# Patient Record
Sex: MALE | Race: WHITE | Employment: OTHER | URBAN - METROPOLITAN AREA
[De-identification: names, ages, dates, MRNs, and addresses within clinical notes are randomized per-mention and may not be internally consistent; named-entity substitution may affect disease eponyms.]

---

## 2017-06-12 PROBLEM — I26.99 ACUTE PULMONARY EMBOLISM (HCC): Status: ACTIVE | Noted: 2017-06-12

## 2017-06-13 PROBLEM — N17.9 ACUTE KIDNEY INJURY (NONTRAUMATIC) (HCC): Status: ACTIVE | Noted: 2017-06-13

## 2017-08-24 PROBLEM — I34.0 NON-RHEUMATIC MITRAL REGURGITATION: Status: ACTIVE | Noted: 2017-08-24

## 2017-08-24 PROBLEM — Z79.01 ANTICOAGULATED ON COUMADIN: Status: ACTIVE | Noted: 2017-08-24

## 2017-08-24 PROBLEM — Z86.711 HX OF PULMONARY EMBOLUS: Status: ACTIVE | Noted: 2017-08-24

## 2017-10-11 PROBLEM — N30.00 ACUTE CYSTITIS: Status: ACTIVE | Noted: 2017-10-11

## 2017-10-11 PROBLEM — T67.1XXA HEAT SYNCOPE: Status: ACTIVE | Noted: 2017-10-11

## 2018-01-16 PROBLEM — R59.0 HILAR ADENOPATHY: Status: ACTIVE | Noted: 2018-01-16

## 2018-01-16 PROBLEM — R59.0 MEDIASTINAL ADENOPATHY: Status: ACTIVE | Noted: 2018-01-16

## 2018-01-16 PROBLEM — J42 CHRONIC BRONCHITIS (HCC): Status: ACTIVE | Noted: 2018-01-16

## 2018-04-10 ENCOUNTER — HOSPITAL ENCOUNTER (OUTPATIENT)
Dept: CT IMAGING | Age: 78
Discharge: HOME | End: 2018-04-10
Payer: MEDICARE

## 2018-04-10 DIAGNOSIS — R59.1 LYMPHADENOPATHY: ICD-10-CM

## 2018-04-10 LAB
BUN BLDV-MCNC: 32 MG/DL (ref 8–23)
CREAT SERPL-MCNC: 1.1 MG/DL (ref 0.7–1.2)
GFR AFRICAN AMERICAN: >60
GFR NON-AFRICAN AMERICAN: >60 ML/MIN/1.73

## 2018-04-10 PROCEDURE — 82565 ASSAY OF CREATININE: CPT

## 2018-04-10 PROCEDURE — 71260 CT THORAX DX C+: CPT

## 2018-04-10 PROCEDURE — 84520 ASSAY OF UREA NITROGEN: CPT

## 2018-04-10 PROCEDURE — 6360000004 HC RX CONTRAST MEDICATION: Performed by: RADIOLOGY

## 2018-04-10 PROCEDURE — 36415 COLL VENOUS BLD VENIPUNCTURE: CPT

## 2018-04-10 RX ADMIN — IOPAMIDOL 80 ML: 755 INJECTION, SOLUTION INTRAVENOUS at 09:29

## 2018-04-19 ENCOUNTER — OFFICE VISIT (OUTPATIENT)
Dept: CARDIOLOGY CLINIC | Age: 78
End: 2018-04-19
Payer: MEDICARE

## 2018-04-19 VITALS
WEIGHT: 192 LBS | DIASTOLIC BLOOD PRESSURE: 54 MMHG | SYSTOLIC BLOOD PRESSURE: 120 MMHG | BODY MASS INDEX: 29.1 KG/M2 | HEIGHT: 68 IN | HEART RATE: 74 BPM

## 2018-04-19 DIAGNOSIS — I36.1 NON-RHEUMATIC TRICUSPID VALVE INSUFFICIENCY: ICD-10-CM

## 2018-04-19 DIAGNOSIS — E11.42 DIABETIC PERIPHERAL NEUROPATHY (HCC): ICD-10-CM

## 2018-04-19 DIAGNOSIS — I44.0 FIRST DEGREE AV BLOCK: ICD-10-CM

## 2018-04-19 DIAGNOSIS — I44.7 LBBB (LEFT BUNDLE BRANCH BLOCK): ICD-10-CM

## 2018-04-19 DIAGNOSIS — Z90.79 S/P TURP: ICD-10-CM

## 2018-04-19 DIAGNOSIS — D64.9 ANEMIA, UNSPECIFIED TYPE: ICD-10-CM

## 2018-04-19 DIAGNOSIS — I25.2 HX OF NON-ST ELEVATION MYOCARDIAL INFARCTION (NSTEMI): ICD-10-CM

## 2018-04-19 DIAGNOSIS — I25.810 CORONARY ARTERY DISEASE INVOLVING CORONARY BYPASS GRAFT OF NATIVE HEART WITHOUT ANGINA PECTORIS: ICD-10-CM

## 2018-04-19 DIAGNOSIS — L13.0 DERMATITIS HERPETIFORMIS: ICD-10-CM

## 2018-04-19 DIAGNOSIS — Z86.711 HX OF PULMONARY EMBOLUS: ICD-10-CM

## 2018-04-19 DIAGNOSIS — I25.5 ISCHEMIC CARDIOMYOPATHY: ICD-10-CM

## 2018-04-19 DIAGNOSIS — E78.2 MIXED HYPERLIPIDEMIA: ICD-10-CM

## 2018-04-19 DIAGNOSIS — I25.10 CAD IN NATIVE ARTERY: ICD-10-CM

## 2018-04-19 DIAGNOSIS — I50.42 CHRONIC COMBINED SYSTOLIC AND DIASTOLIC CONGESTIVE HEART FAILURE (HCC): ICD-10-CM

## 2018-04-19 DIAGNOSIS — T46.4X5A COUGH DUE TO ACE INHIBITOR: ICD-10-CM

## 2018-04-19 DIAGNOSIS — I25.2 H/O ACUTE MYOCARDIAL INFARCTION OF INFERIOR WALL: ICD-10-CM

## 2018-04-19 DIAGNOSIS — Z79.4 TYPE 2 DIABETES MELLITUS WITH COMPLICATION, WITH LONG-TERM CURRENT USE OF INSULIN (HCC): ICD-10-CM

## 2018-04-19 DIAGNOSIS — Z95.1 HX OF CABG: ICD-10-CM

## 2018-04-19 DIAGNOSIS — R05.8 COUGH DUE TO ACE INHIBITOR: ICD-10-CM

## 2018-04-19 DIAGNOSIS — E11.8 TYPE 2 DIABETES MELLITUS WITH COMPLICATION, WITH LONG-TERM CURRENT USE OF INSULIN (HCC): ICD-10-CM

## 2018-04-19 DIAGNOSIS — I10 ESSENTIAL HYPERTENSION: Primary | ICD-10-CM

## 2018-04-19 DIAGNOSIS — Z87.01 H/O: PNEUMONIA: ICD-10-CM

## 2018-04-19 DIAGNOSIS — N18.30 STAGE 3 CHRONIC KIDNEY DISEASE (HCC): ICD-10-CM

## 2018-04-19 DIAGNOSIS — I73.9 PAD (PERIPHERAL ARTERY DISEASE) (HCC): ICD-10-CM

## 2018-04-19 DIAGNOSIS — J44.9 CHRONIC OBSTRUCTIVE PULMONARY DISEASE, UNSPECIFIED COPD TYPE (HCC): ICD-10-CM

## 2018-04-19 DIAGNOSIS — I34.0 NON-RHEUMATIC MITRAL REGURGITATION: ICD-10-CM

## 2018-04-19 DIAGNOSIS — Z92.89 TRANSFUSION HISTORY: ICD-10-CM

## 2018-04-19 PROCEDURE — 99214 OFFICE O/P EST MOD 30 MIN: CPT | Performed by: INTERNAL MEDICINE

## 2018-04-19 PROCEDURE — 93000 ELECTROCARDIOGRAM COMPLETE: CPT | Performed by: INTERNAL MEDICINE

## 2018-04-19 RX ORDER — PREDNISONE 10 MG/1
TABLET ORAL
Refills: 1 | COMMUNITY
Start: 2018-03-28 | End: 2018-10-18

## 2018-05-02 ENCOUNTER — OFFICE VISIT (OUTPATIENT)
Dept: ONCOLOGY | Age: 78
End: 2018-05-02
Payer: MEDICARE

## 2018-05-02 ENCOUNTER — HOSPITAL ENCOUNTER (OUTPATIENT)
Dept: INFUSION THERAPY | Age: 78
Discharge: HOME | End: 2018-05-02
Payer: MEDICARE

## 2018-05-02 VITALS
SYSTOLIC BLOOD PRESSURE: 118 MMHG | TEMPERATURE: 97.3 F | WEIGHT: 191.5 LBS | DIASTOLIC BLOOD PRESSURE: 63 MMHG | BODY MASS INDEX: 29.02 KG/M2 | RESPIRATION RATE: 20 BRPM | HEIGHT: 68 IN | HEART RATE: 59 BPM

## 2018-05-02 DIAGNOSIS — D64.9 ANEMIA, UNSPECIFIED TYPE: Primary | ICD-10-CM

## 2018-05-02 DIAGNOSIS — R59.0 MEDIASTINAL ADENOPATHY: ICD-10-CM

## 2018-05-02 LAB
ALBUMIN SERPL-MCNC: 4 G/DL (ref 3.5–5.2)
ALP BLD-CCNC: 63 U/L (ref 40–129)
ALT SERPL-CCNC: 41 U/L (ref 0–40)
ANION GAP SERPL CALCULATED.3IONS-SCNC: 10 MMOL/L (ref 7–16)
ANISOCYTOSIS: ABNORMAL
AST SERPL-CCNC: 27 U/L (ref 0–39)
BASOPHILS ABSOLUTE: 0.02 E9/L (ref 0–0.2)
BASOPHILS RELATIVE PERCENT: 0.3 % (ref 0–2)
BILIRUB SERPL-MCNC: 0.4 MG/DL (ref 0–1.2)
BILIRUBIN URINE: NEGATIVE
BLOOD, URINE: NEGATIVE
BUN BLDV-MCNC: 35 MG/DL (ref 8–23)
CALCIUM SERPL-MCNC: 9.3 MG/DL (ref 8.6–10.2)
CHLORIDE BLD-SCNC: 98 MMOL/L (ref 98–107)
CLARITY: CLEAR
CO2: 27 MMOL/L (ref 22–29)
COLOR: YELLOW
CREAT SERPL-MCNC: 1 MG/DL (ref 0.7–1.2)
EOSINOPHILS ABSOLUTE: 0.01 E9/L (ref 0.05–0.5)
EOSINOPHILS RELATIVE PERCENT: 0.1 % (ref 0–6)
FERRITIN: 43 NG/ML
FOLATE: >20 NG/ML (ref 4.8–24.2)
GFR AFRICAN AMERICAN: >60
GFR NON-AFRICAN AMERICAN: >60 ML/MIN/1.73
GLUCOSE BLD-MCNC: 282 MG/DL (ref 74–109)
GLUCOSE URINE: 500 MG/DL
HCT VFR BLD CALC: 37.9 % (ref 37–54)
HEMOGLOBIN: 12.2 G/DL (ref 12.5–16.5)
HYPOCHROMIA: ABNORMAL
IMMATURE GRANULOCYTES #: 0.06 E9/L
IMMATURE GRANULOCYTES %: 0.8 % (ref 0–5)
IMMATURE RETIC FRACT: 8.2 % (ref 2.3–13.4)
IRON SATURATION: 67 % (ref 20–55)
IRON: 228 MCG/DL (ref 59–158)
KETONES, URINE: ABNORMAL MG/DL
LACTATE DEHYDROGENASE: 338 U/L (ref 135–225)
LEUKOCYTE ESTERASE, URINE: NEGATIVE
LYMPHOCYTES ABSOLUTE: 0.65 E9/L (ref 1.5–4)
LYMPHOCYTES RELATIVE PERCENT: 9 % (ref 20–42)
MCH RBC QN AUTO: 28 PG (ref 26–35)
MCHC RBC AUTO-ENTMCNC: 32.2 % (ref 32–34.5)
MCV RBC AUTO: 87.1 FL (ref 80–99.9)
MONOCYTES ABSOLUTE: 0.29 E9/L (ref 0.1–0.95)
MONOCYTES RELATIVE PERCENT: 4 % (ref 2–12)
NEUTROPHILS ABSOLUTE: 6.17 E9/L (ref 1.8–7.3)
NEUTROPHILS RELATIVE PERCENT: 85.8 % (ref 43–80)
NITRITE, URINE: NEGATIVE
OVALOCYTES: ABNORMAL
PDW BLD-RTO: 20.8 FL (ref 11.5–15)
PH UA: 8 (ref 5–9)
PLATELET # BLD: 215 E9/L (ref 130–450)
PMV BLD AUTO: 10.9 FL (ref 7–12)
POIKILOCYTES: ABNORMAL
POTASSIUM SERPL-SCNC: 5.8 MMOL/L (ref 3.5–5)
PROTEIN UA: NEGATIVE MG/DL
RBC # BLD: 4.35 E12/L (ref 3.8–5.8)
RETIC HGB EQUIVALENT: 36.9 PG (ref 28.2–36.6)
RETICULOCYTE ABSOLUTE COUNT: 0.05 E12/L
RETICULOCYTE COUNT PCT: 1.2 % (ref 0.4–1.9)
SODIUM BLD-SCNC: 135 MMOL/L (ref 132–146)
SPECIFIC GRAVITY UA: 1.01 (ref 1–1.03)
TARGET CELLS: ABNORMAL
TOTAL IRON BINDING CAPACITY: 341 MCG/DL (ref 250–450)
TOTAL PROTEIN: 6.5 G/DL (ref 6.4–8.3)
TSH SERPL DL<=0.05 MIU/L-ACNC: 1.03 UIU/ML (ref 0.27–4.2)
UROBILINOGEN, URINE: 0.2 E.U./DL
VITAMIN B-12: 760 PG/ML (ref 211–946)
WBC # BLD: 7.2 E9/L (ref 4.5–11.5)

## 2018-05-02 PROCEDURE — 36415 COLL VENOUS BLD VENIPUNCTURE: CPT

## 2018-05-02 PROCEDURE — 84443 ASSAY THYROID STIM HORMONE: CPT

## 2018-05-02 PROCEDURE — 83550 IRON BINDING TEST: CPT

## 2018-05-02 PROCEDURE — 85045 AUTOMATED RETICULOCYTE COUNT: CPT

## 2018-05-02 PROCEDURE — 99214 OFFICE O/P EST MOD 30 MIN: CPT

## 2018-05-02 PROCEDURE — 82955 ASSAY OF G6PD ENZYME: CPT

## 2018-05-02 PROCEDURE — 81003 URINALYSIS AUTO W/O SCOPE: CPT

## 2018-05-02 PROCEDURE — 82784 ASSAY IGA/IGD/IGG/IGM EACH: CPT

## 2018-05-02 PROCEDURE — 83516 IMMUNOASSAY NONANTIBODY: CPT

## 2018-05-02 PROCEDURE — 82746 ASSAY OF FOLIC ACID SERUM: CPT

## 2018-05-02 PROCEDURE — 82728 ASSAY OF FERRITIN: CPT

## 2018-05-02 PROCEDURE — 83615 LACTATE (LD) (LDH) ENZYME: CPT

## 2018-05-02 PROCEDURE — 80053 COMPREHEN METABOLIC PANEL: CPT

## 2018-05-02 PROCEDURE — 82607 VITAMIN B-12: CPT

## 2018-05-02 PROCEDURE — 83540 ASSAY OF IRON: CPT

## 2018-05-02 PROCEDURE — 85025 COMPLETE CBC W/AUTO DIFF WBC: CPT

## 2018-05-04 LAB
G-6-PD, QUANT: 13.8 U/G HB (ref 9.9–16.6)
IGA: 201 MG/DL (ref 70–400)
IGG: 608 MG/DL (ref 700–1600)
IGM: 48 MG/DL (ref 40–230)
TISSUE TRANSGLUTAMINASE IGA: 4 U/ML (ref 0–3)

## 2018-05-30 ENCOUNTER — HOSPITAL ENCOUNTER (OUTPATIENT)
Dept: INFUSION THERAPY | Age: 78
Discharge: HOME | End: 2018-05-30
Payer: MEDICARE

## 2018-05-30 ENCOUNTER — OFFICE VISIT (OUTPATIENT)
Dept: ONCOLOGY | Age: 78
End: 2018-05-30
Payer: MEDICARE

## 2018-05-30 VITALS
TEMPERATURE: 97.6 F | WEIGHT: 190.9 LBS | DIASTOLIC BLOOD PRESSURE: 55 MMHG | HEART RATE: 63 BPM | HEIGHT: 68 IN | SYSTOLIC BLOOD PRESSURE: 112 MMHG | RESPIRATION RATE: 16 BRPM | BODY MASS INDEX: 28.93 KG/M2

## 2018-05-30 DIAGNOSIS — D64.9 ANEMIA, UNSPECIFIED TYPE: Primary | ICD-10-CM

## 2018-05-30 PROCEDURE — 99212 OFFICE O/P EST SF 10 MIN: CPT | Performed by: INTERNAL MEDICINE

## 2018-09-26 ENCOUNTER — HOSPITAL ENCOUNTER (OUTPATIENT)
Age: 78
Discharge: HOME | End: 2018-09-26
Payer: MEDICARE

## 2018-09-26 ENCOUNTER — OFFICE VISIT (OUTPATIENT)
Dept: ONCOLOGY | Age: 78
End: 2018-09-26
Payer: MEDICARE

## 2018-09-26 ENCOUNTER — HOSPITAL ENCOUNTER (OUTPATIENT)
Dept: INFUSION THERAPY | Age: 78
Discharge: HOME | End: 2018-09-26
Payer: MEDICARE

## 2018-09-26 VITALS
WEIGHT: 191.4 LBS | RESPIRATION RATE: 20 BRPM | DIASTOLIC BLOOD PRESSURE: 53 MMHG | TEMPERATURE: 97.3 F | SYSTOLIC BLOOD PRESSURE: 115 MMHG | HEART RATE: 55 BPM | HEIGHT: 68 IN | BODY MASS INDEX: 29.01 KG/M2

## 2018-09-26 DIAGNOSIS — D64.9 ANEMIA, UNSPECIFIED TYPE: ICD-10-CM

## 2018-09-26 DIAGNOSIS — D64.9 ANEMIA, UNSPECIFIED TYPE: Primary | ICD-10-CM

## 2018-09-26 LAB
ALBUMIN SERPL-MCNC: 3.9 G/DL (ref 3.5–5.2)
ALBUMIN SERPL-MCNC: 3.9 G/DL (ref 3.5–5.2)
ALP BLD-CCNC: 71 U/L (ref 40–129)
ALP BLD-CCNC: 72 U/L (ref 40–129)
ALT SERPL-CCNC: 26 U/L (ref 0–40)
ALT SERPL-CCNC: 27 U/L (ref 0–40)
ANION GAP SERPL CALCULATED.3IONS-SCNC: 10 MMOL/L (ref 7–16)
ANION GAP SERPL CALCULATED.3IONS-SCNC: 11 MMOL/L (ref 7–16)
AST SERPL-CCNC: 26 U/L (ref 0–39)
AST SERPL-CCNC: 28 U/L (ref 0–39)
BASOPHILS ABSOLUTE: 0.05 E9/L (ref 0–0.2)
BASOPHILS RELATIVE PERCENT: 0.9 % (ref 0–2)
BILIRUB SERPL-MCNC: 0.4 MG/DL (ref 0–1.2)
BILIRUB SERPL-MCNC: 0.4 MG/DL (ref 0–1.2)
BUN BLDV-MCNC: 29 MG/DL (ref 8–23)
BUN BLDV-MCNC: 29 MG/DL (ref 8–23)
CALCIUM SERPL-MCNC: 9.4 MG/DL (ref 8.6–10.2)
CALCIUM SERPL-MCNC: 9.5 MG/DL (ref 8.6–10.2)
CHLORIDE BLD-SCNC: 106 MMOL/L (ref 98–107)
CHLORIDE BLD-SCNC: 107 MMOL/L (ref 98–107)
CHOLESTEROL, TOTAL: 141 MG/DL (ref 0–199)
CO2: 28 MMOL/L (ref 22–29)
CO2: 28 MMOL/L (ref 22–29)
CREAT SERPL-MCNC: 1.2 MG/DL (ref 0.7–1.2)
CREAT SERPL-MCNC: 1.2 MG/DL (ref 0.7–1.2)
EOSINOPHILS ABSOLUTE: 0.31 E9/L (ref 0.05–0.5)
EOSINOPHILS RELATIVE PERCENT: 5.6 % (ref 0–6)
GFR AFRICAN AMERICAN: >60
GFR AFRICAN AMERICAN: >60
GFR NON-AFRICAN AMERICAN: 58 ML/MIN/1.73
GFR NON-AFRICAN AMERICAN: 58 ML/MIN/1.73
GLUCOSE BLD-MCNC: 154 MG/DL (ref 74–109)
GLUCOSE BLD-MCNC: 155 MG/DL (ref 74–109)
HBA1C MFR BLD: 6.1 % (ref 4–5.6)
HCT VFR BLD CALC: 35.8 % (ref 37–54)
HDLC SERPL-MCNC: 52 MG/DL
HEMOGLOBIN: 11.9 G/DL (ref 12.5–16.5)
IMMATURE GRANULOCYTES #: 0.01 E9/L
IMMATURE GRANULOCYTES %: 0.2 % (ref 0–5)
LACTATE DEHYDROGENASE: 303 U/L (ref 135–225)
LDL CHOLESTEROL CALCULATED: 66 MG/DL (ref 0–99)
LYMPHOCYTES ABSOLUTE: 1.51 E9/L (ref 1.5–4)
LYMPHOCYTES RELATIVE PERCENT: 27.1 % (ref 20–42)
MCH RBC QN AUTO: 34 PG (ref 26–35)
MCHC RBC AUTO-ENTMCNC: 33.2 % (ref 32–34.5)
MCV RBC AUTO: 102.3 FL (ref 80–99.9)
MONOCYTES ABSOLUTE: 0.62 E9/L (ref 0.1–0.95)
MONOCYTES RELATIVE PERCENT: 11.1 % (ref 2–12)
NEUTROPHILS ABSOLUTE: 3.07 E9/L (ref 1.8–7.3)
NEUTROPHILS RELATIVE PERCENT: 55.1 % (ref 43–80)
PDW BLD-RTO: 12.4 FL (ref 11.5–15)
PLATELET # BLD: 162 E9/L (ref 130–450)
PMV BLD AUTO: 11.5 FL (ref 7–12)
POTASSIUM SERPL-SCNC: 4 MMOL/L (ref 3.5–5)
POTASSIUM SERPL-SCNC: 4.1 MMOL/L (ref 3.5–5)
RBC # BLD: 3.5 E12/L (ref 3.8–5.8)
SODIUM BLD-SCNC: 144 MMOL/L (ref 132–146)
SODIUM BLD-SCNC: 146 MMOL/L (ref 132–146)
TOTAL PROTEIN: 6.3 G/DL (ref 6.4–8.3)
TOTAL PROTEIN: 6.4 G/DL (ref 6.4–8.3)
TRIGL SERPL-MCNC: 115 MG/DL (ref 0–149)
VLDLC SERPL CALC-MCNC: 23 MG/DL
WBC # BLD: 5.6 E9/L (ref 4.5–11.5)

## 2018-09-26 PROCEDURE — 83615 LACTATE (LD) (LDH) ENZYME: CPT

## 2018-09-26 PROCEDURE — 99212 OFFICE O/P EST SF 10 MIN: CPT | Performed by: INTERNAL MEDICINE

## 2018-09-26 PROCEDURE — 80053 COMPREHEN METABOLIC PANEL: CPT

## 2018-09-26 PROCEDURE — 83721 ASSAY OF BLOOD LIPOPROTEIN: CPT

## 2018-09-26 PROCEDURE — 36415 COLL VENOUS BLD VENIPUNCTURE: CPT

## 2018-09-26 PROCEDURE — 36415 COLL VENOUS BLD VENIPUNCTURE: CPT | Performed by: INTERNAL MEDICINE

## 2018-09-26 PROCEDURE — 80061 LIPID PANEL: CPT

## 2018-09-26 PROCEDURE — 85025 COMPLETE CBC W/AUTO DIFF WBC: CPT

## 2018-09-26 PROCEDURE — 83036 HEMOGLOBIN GLYCOSYLATED A1C: CPT

## 2018-09-26 NOTE — PROGRESS NOTES
[address] [facility],[address]  [facility]        Pt Name: Gurvinder Hurley  YOB: 1940  Date of evaluation: 9/26/2018  Primary Care Physician: Megha Torrez MD  Reason for evaluation:   Chief Complaint   Patient presents with    Anemia    Follow-up        Subjective: Here for follow-up. Feels well today. On Dapsone,  initiated by his dermatologist   His [address] is very well controlled    OBJECTIVE:  VITALS:  height is 5' 8\" (1.727 m) and weight is 191 lb 6.4 oz (86.8 kg). His temporal temperature is 97.3 °F (36.3 °C). His blood pressure is 115/53 (abnormal) and his pulse is 55. His respiration is 20. Physical Exam:  Performance Status: 0  Well developed, well nourished male  General: AAO to person, place, time, in no acute distress, pleasant   Head and neck : PERRLA, EOMI . Sclera non icteric. Oropharynx : Clear  Neck: no JVD,  no adenopathy,  Heart: Regular rate and regular rhythm, no murmur. Mid sternotomy scar noted  Lungs: Few scattered expiratory rhonchi  Abdomen: Soft, non-tender;no masses, no organomegaly  Extremities: No edema,no cyanosis, no clubbing. Neurologic:Cranial nerves grossly intact. No focal motor or sensory deficits . Skin:  Nodular rash on extremities consistent with DH      Medications  Prior to Admission medications    Medication Sig Start Date End Date Taking?  Authorizing Provider   Cholecalciferol (VITAMIN D3) 5000 units TABS Take by mouth   Yes Historical Provider, MD   predniSONE (DELTASONE) 10 MG tablet TK 2 TS PO QAM WITH FOOD 3/28/18  Yes Historical Provider, MD   rivaroxaban (XARELTO) 20 MG TABS tablet Take 1 tablet by mouth daily 3/19/18  Yes Sheldon Chen, MD   sacubitril-valsartan (ENTRESTO) 24-26 MG per tablet Take 1 tablet by mouth 2 times daily 3/1/18  Yes Sheldon Chen, MD   Insulin Aspart Prot & Aspart (NOVOLOG MIX 70/30 FLEXPEN SC) Inject 25 Units into the skin   Yes Historical Provider, MD   pantoprazole (PROTONIX) 40 MG tablet TK 1 T

## 2018-09-28 LAB — MISCELLANEOUS LAB TEST RESULT: NORMAL

## 2018-10-18 ENCOUNTER — OFFICE VISIT (OUTPATIENT)
Dept: CARDIOLOGY CLINIC | Age: 78
End: 2018-10-18
Payer: MEDICARE

## 2018-10-18 VITALS
HEIGHT: 68 IN | HEART RATE: 60 BPM | BODY MASS INDEX: 29.7 KG/M2 | SYSTOLIC BLOOD PRESSURE: 110 MMHG | WEIGHT: 196 LBS | DIASTOLIC BLOOD PRESSURE: 60 MMHG

## 2018-10-18 DIAGNOSIS — I25.5 ISCHEMIC CARDIOMYOPATHY: Primary | ICD-10-CM

## 2018-10-18 DIAGNOSIS — I25.2 H/O ACUTE MYOCARDIAL INFARCTION OF INFERIOR WALL: ICD-10-CM

## 2018-10-18 DIAGNOSIS — I25.10 CAD IN NATIVE ARTERY: ICD-10-CM

## 2018-10-18 DIAGNOSIS — Z92.89 TRANSFUSION HISTORY: ICD-10-CM

## 2018-10-18 DIAGNOSIS — R59.0 MEDIASTINAL ADENOPATHY: ICD-10-CM

## 2018-10-18 DIAGNOSIS — E11.8 TYPE 2 DIABETES MELLITUS WITH COMPLICATION, WITH LONG-TERM CURRENT USE OF INSULIN (HCC): ICD-10-CM

## 2018-10-18 DIAGNOSIS — R59.0 HILAR ADENOPATHY: ICD-10-CM

## 2018-10-18 DIAGNOSIS — D64.9 ANEMIA, UNSPECIFIED TYPE: ICD-10-CM

## 2018-10-18 DIAGNOSIS — I50.22 CHRONIC SYSTOLIC CONGESTIVE HEART FAILURE (HCC): ICD-10-CM

## 2018-10-18 DIAGNOSIS — Z79.4 TYPE 2 DIABETES MELLITUS WITH COMPLICATION, WITH LONG-TERM CURRENT USE OF INSULIN (HCC): ICD-10-CM

## 2018-10-18 DIAGNOSIS — I44.0 FIRST DEGREE AV BLOCK: ICD-10-CM

## 2018-10-18 DIAGNOSIS — J42 CHRONIC BRONCHITIS, UNSPECIFIED CHRONIC BRONCHITIS TYPE (HCC): ICD-10-CM

## 2018-10-18 DIAGNOSIS — I25.810 CORONARY ARTERY DISEASE INVOLVING CORONARY BYPASS GRAFT OF NATIVE HEART WITHOUT ANGINA PECTORIS: ICD-10-CM

## 2018-10-18 DIAGNOSIS — Z95.1 HX OF CABG: ICD-10-CM

## 2018-10-18 DIAGNOSIS — I25.2 HX OF NON-ST ELEVATION MYOCARDIAL INFARCTION (NSTEMI): ICD-10-CM

## 2018-10-18 DIAGNOSIS — I73.9 PAD (PERIPHERAL ARTERY DISEASE) (HCC): ICD-10-CM

## 2018-10-18 DIAGNOSIS — L13.0 DERMATITIS HERPETIFORMIS: ICD-10-CM

## 2018-10-18 DIAGNOSIS — I51.9 LV DYSFUNCTION: ICD-10-CM

## 2018-10-18 DIAGNOSIS — R05.8 COUGH DUE TO ACE INHIBITOR: ICD-10-CM

## 2018-10-18 DIAGNOSIS — Z86.711 HX OF PULMONARY EMBOLUS: ICD-10-CM

## 2018-10-18 DIAGNOSIS — I10 ESSENTIAL HYPERTENSION: ICD-10-CM

## 2018-10-18 DIAGNOSIS — N18.30 STAGE 3 CHRONIC KIDNEY DISEASE (HCC): ICD-10-CM

## 2018-10-18 DIAGNOSIS — T46.4X5A COUGH DUE TO ACE INHIBITOR: ICD-10-CM

## 2018-10-18 DIAGNOSIS — I44.7 LBBB (LEFT BUNDLE BRANCH BLOCK): ICD-10-CM

## 2018-10-18 PROCEDURE — 99214 OFFICE O/P EST MOD 30 MIN: CPT | Performed by: INTERNAL MEDICINE

## 2018-10-18 PROCEDURE — 93000 ELECTROCARDIOGRAM COMPLETE: CPT | Performed by: INTERNAL MEDICINE

## 2018-10-18 RX ORDER — CARVEDILOL 25 MG/1
25 TABLET ORAL 2 TIMES DAILY
Qty: 180 TABLET | Refills: 3 | Status: SHIPPED | OUTPATIENT
Start: 2018-10-18 | End: 2019-01-01 | Stop reason: SDUPTHER

## 2018-10-22 NOTE — PROGRESS NOTES
reaction, orthostatic hypotension, dehydration and acute cystitis.        PAST MEDICAL HISTORY:   1. As under cardiovascular history. 2. Status post TURP for enlarged prostate. 3. History of pneumonia. 4. ACE inhibitor-induced cough. 5. Small right upper lobe pulmonary embolus diagnosed on CTA of the chest done on 2017 at which time patient was started on Coumadin therapy.  The CTA of the chest also was reported as showing multifocal ground-glass opacities in the right lobe with mediastinal and hilar adenopathy which could be neoplastic or reactive.    6.  History of colonoscopy. 7.  Severe anemia 2018: HGB 7.9, HCT 25.8:  Received 2 units blood transfusion. Repeat H&H 3/2/2018 was 11.1/35.4 respectively. 8.  Upper endoscopy 2018:  Reportedly negative. 9.  Dermatitis herpetiformis.      FAMILY HISTORY:   Mother  of stroke at age 68; father  at age 80; had myocardial infarction at age 61. Brothers and sisters have hypertension. One sister has lupus.       SOCIAL HISTORY:   Patient continues to smoke 3-4 cigarettes a week with emotional stress; he does not drink alcohol. He follows a low fat diabetic diet.        O:  COMPLETE PHYSICAL EXAM:      /60   Pulse 60   Ht 5' 8\" (1.727 m)   Wt 196 lb (88.9 kg)   BMI 29.80 kg/m²      General:   Elderly male in no acute distress. Head & Neck:  Atraumatic normocephalic head. No JVD. No carotid bruits. Carotid upstrokes normal bilaterally. No thyromegaly. Sclerae not icteric. No xanthelasmas. Mucous membranes moist.  Chest:   Symmetrical and nontender. Old sternotomy scar well healed. Lungs:  Fairly clear bilaterally.      Heart:              Normal S1 and S2. No S3 or S4. No murmurs or rubs. Abdomen:  Soft, nontender without organomegaly or masses. No bruits. Normal bowel sounds. Extremities:   1+ edema. Varicosities noted. Femoral pulses diminished but palpable. Skin:     Normal turgor. No ulcers or rashes noted.   Neuro:

## 2018-11-28 RX ORDER — FUROSEMIDE 40 MG/1
40 TABLET ORAL DAILY
Qty: 90 TABLET | Refills: 3 | Status: SHIPPED | OUTPATIENT
Start: 2018-11-28 | End: 2019-01-01 | Stop reason: SDUPTHER

## 2019-01-01 ENCOUNTER — OFFICE VISIT (OUTPATIENT)
Dept: ONCOLOGY | Age: 79
End: 2019-01-01
Payer: MEDICARE

## 2019-01-01 ENCOUNTER — OFFICE VISIT (OUTPATIENT)
Dept: CARDIOLOGY CLINIC | Age: 79
End: 2019-01-01
Payer: MEDICARE

## 2019-01-01 ENCOUNTER — HOSPITAL ENCOUNTER (OUTPATIENT)
Dept: INFUSION THERAPY | Age: 79
Discharge: HOME | End: 2019-09-25
Payer: MEDICARE

## 2019-01-01 VITALS
SYSTOLIC BLOOD PRESSURE: 110 MMHG | HEART RATE: 60 BPM | BODY MASS INDEX: 28.49 KG/M2 | DIASTOLIC BLOOD PRESSURE: 48 MMHG | HEIGHT: 68 IN | WEIGHT: 188 LBS

## 2019-01-01 VITALS
OXYGEN SATURATION: 95 % | DIASTOLIC BLOOD PRESSURE: 58 MMHG | HEIGHT: 68 IN | TEMPERATURE: 96.6 F | WEIGHT: 187.2 LBS | SYSTOLIC BLOOD PRESSURE: 117 MMHG | BODY MASS INDEX: 28.37 KG/M2 | HEART RATE: 56 BPM

## 2019-01-01 DIAGNOSIS — I44.0 FIRST DEGREE ATRIOVENTRICULAR BLOCK: ICD-10-CM

## 2019-01-01 DIAGNOSIS — I73.9 PAD (PERIPHERAL ARTERY DISEASE) (HCC): ICD-10-CM

## 2019-01-01 DIAGNOSIS — Z95.1 HX OF CABG: ICD-10-CM

## 2019-01-01 DIAGNOSIS — D64.9 ANEMIA, UNSPECIFIED TYPE: Primary | ICD-10-CM

## 2019-01-01 DIAGNOSIS — R59.0 HILAR ADENOPATHY: ICD-10-CM

## 2019-01-01 DIAGNOSIS — T46.4X5A ACE-INHIBITOR COUGH: ICD-10-CM

## 2019-01-01 DIAGNOSIS — I44.7 LBBB (LEFT BUNDLE BRANCH BLOCK): ICD-10-CM

## 2019-01-01 DIAGNOSIS — D64.9 ANEMIA, UNSPECIFIED TYPE: ICD-10-CM

## 2019-01-01 DIAGNOSIS — I50.22 CHRONIC SYSTOLIC CONGESTIVE HEART FAILURE (HCC): ICD-10-CM

## 2019-01-01 DIAGNOSIS — Z79.4 INSULIN-REQUIRING OR DEPENDENT TYPE II DIABETES MELLITUS (HCC): ICD-10-CM

## 2019-01-01 DIAGNOSIS — I25.5 ISCHEMIC CARDIOMYOPATHY: ICD-10-CM

## 2019-01-01 DIAGNOSIS — Z86.2 H/O: IRON DEFICIENCY ANEMIA: ICD-10-CM

## 2019-01-01 DIAGNOSIS — Z86.711 HX OF PULMONARY EMBOLUS: ICD-10-CM

## 2019-01-01 DIAGNOSIS — I25.810 CORONARY ARTERY DISEASE INVOLVING CORONARY BYPASS GRAFT OF NATIVE HEART WITHOUT ANGINA PECTORIS: ICD-10-CM

## 2019-01-01 DIAGNOSIS — J44.9 CHRONIC OBSTRUCTIVE PULMONARY DISEASE, UNSPECIFIED COPD TYPE (HCC): ICD-10-CM

## 2019-01-01 DIAGNOSIS — I25.2 HISTORY OF ACUTE INFERIOR WALL MYOCARDIAL INFARCTION: Primary | ICD-10-CM

## 2019-01-01 DIAGNOSIS — L13.0 DERMATITIS HERPETIFORMIS: ICD-10-CM

## 2019-01-01 DIAGNOSIS — R59.0 MEDIASTINAL ADENOPATHY: ICD-10-CM

## 2019-01-01 DIAGNOSIS — E11.9 INSULIN-REQUIRING OR DEPENDENT TYPE II DIABETES MELLITUS (HCC): ICD-10-CM

## 2019-01-01 DIAGNOSIS — I25.10 CAD IN NATIVE ARTERY: ICD-10-CM

## 2019-01-01 DIAGNOSIS — R05.8 ACE-INHIBITOR COUGH: ICD-10-CM

## 2019-01-01 LAB
ALBUMIN SERPL-MCNC: 4 G/DL (ref 3.5–5.2)
ALP BLD-CCNC: 89 U/L (ref 40–129)
ALT SERPL-CCNC: 28 U/L (ref 0–40)
ANION GAP SERPL CALCULATED.3IONS-SCNC: 9 MMOL/L (ref 7–16)
AST SERPL-CCNC: 25 U/L (ref 0–39)
BASOPHILS ABSOLUTE: 0.06 E9/L (ref 0–0.2)
BASOPHILS RELATIVE PERCENT: 1.2 % (ref 0–2)
BILIRUB SERPL-MCNC: 0.5 MG/DL (ref 0–1.2)
BUN BLDV-MCNC: 23 MG/DL (ref 8–23)
CALCIUM SERPL-MCNC: 9 MG/DL (ref 8.6–10.2)
CHLORIDE BLD-SCNC: 104 MMOL/L (ref 98–107)
CO2: 25 MMOL/L (ref 22–29)
CREAT SERPL-MCNC: 1 MG/DL (ref 0.7–1.2)
EOSINOPHILS ABSOLUTE: 0.26 E9/L (ref 0.05–0.5)
EOSINOPHILS RELATIVE PERCENT: 5.1 % (ref 0–6)
GFR AFRICAN AMERICAN: >60
GFR NON-AFRICAN AMERICAN: >60 ML/MIN/1.73
GLUCOSE BLD-MCNC: 449 MG/DL (ref 74–99)
HAPTOGLOBIN: <10 MG/DL (ref 30–200)
HCT VFR BLD CALC: 34.2 % (ref 37–54)
HEMOGLOBIN: 11.6 G/DL (ref 12.5–16.5)
IMMATURE GRANULOCYTES #: 0.02 E9/L
IMMATURE GRANULOCYTES %: 0.4 % (ref 0–5)
IMMATURE RETIC FRACT: 9.1 % (ref 2.3–13.4)
LACTATE DEHYDROGENASE: 286 U/L (ref 135–225)
LYMPHOCYTES ABSOLUTE: 1.29 E9/L (ref 1.5–4)
LYMPHOCYTES RELATIVE PERCENT: 25.2 % (ref 20–42)
MCH RBC QN AUTO: 35.5 PG (ref 26–35)
MCHC RBC AUTO-ENTMCNC: 33.9 % (ref 32–34.5)
MCV RBC AUTO: 104.6 FL (ref 80–99.9)
MONOCYTES ABSOLUTE: 0.53 E9/L (ref 0.1–0.95)
MONOCYTES RELATIVE PERCENT: 10.4 % (ref 2–12)
NEUTROPHILS ABSOLUTE: 2.95 E9/L (ref 1.8–7.3)
NEUTROPHILS RELATIVE PERCENT: 57.7 % (ref 43–80)
PDW BLD-RTO: 12.2 FL (ref 11.5–15)
PLATELET # BLD: 137 E9/L (ref 130–450)
PMV BLD AUTO: 11.7 FL (ref 7–12)
POTASSIUM SERPL-SCNC: 4.9 MMOL/L (ref 3.5–5)
RBC # BLD: 3.27 E12/L (ref 3.8–5.8)
RETIC HGB EQUIVALENT: 39.4 PG (ref 28.2–36.6)
RETICULOCYTE ABSOLUTE COUNT: 0.09 E12/L
RETICULOCYTE COUNT PCT: 2.8 % (ref 0.4–1.9)
SODIUM BLD-SCNC: 138 MMOL/L (ref 132–146)
TOTAL PROTEIN: 6.3 G/DL (ref 6.4–8.3)
WBC # BLD: 5.1 E9/L (ref 4.5–11.5)

## 2019-01-01 PROCEDURE — 99213 OFFICE O/P EST LOW 20 MIN: CPT | Performed by: INTERNAL MEDICINE

## 2019-01-01 PROCEDURE — 85025 COMPLETE CBC W/AUTO DIFF WBC: CPT

## 2019-01-01 PROCEDURE — 80053 COMPREHEN METABOLIC PANEL: CPT

## 2019-01-01 PROCEDURE — 83010 ASSAY OF HAPTOGLOBIN QUANT: CPT

## 2019-01-01 PROCEDURE — 85045 AUTOMATED RETICULOCYTE COUNT: CPT

## 2019-01-01 PROCEDURE — 83615 LACTATE (LD) (LDH) ENZYME: CPT

## 2019-01-01 PROCEDURE — 36415 COLL VENOUS BLD VENIPUNCTURE: CPT

## 2019-01-01 PROCEDURE — 93000 ELECTROCARDIOGRAM COMPLETE: CPT | Performed by: INTERNAL MEDICINE

## 2019-01-01 PROCEDURE — 99213 OFFICE O/P EST LOW 20 MIN: CPT

## 2019-01-01 RX ORDER — FOLIC ACID 1 MG/1
1 TABLET ORAL DAILY
Qty: 90 TABLET | Refills: 3 | Status: SHIPPED
Start: 2019-01-01 | End: 2020-01-01 | Stop reason: SDUPTHER

## 2019-01-01 RX ORDER — CARVEDILOL 25 MG/1
25 TABLET ORAL 2 TIMES DAILY
Qty: 180 TABLET | Refills: 3 | Status: SHIPPED | OUTPATIENT
Start: 2019-01-01

## 2019-01-01 RX ORDER — FUROSEMIDE 40 MG/1
40 TABLET ORAL DAILY
Qty: 90 TABLET | Refills: 3 | Status: SHIPPED | OUTPATIENT
Start: 2019-01-01

## 2019-02-05 ENCOUNTER — HOSPITAL ENCOUNTER (OUTPATIENT)
Dept: GENERAL RADIOLOGY | Age: 79
Discharge: HOME | End: 2019-02-07
Payer: MEDICARE

## 2019-02-05 ENCOUNTER — HOSPITAL ENCOUNTER (OUTPATIENT)
Age: 79
Discharge: HOME | End: 2019-02-07
Payer: MEDICARE

## 2019-02-05 DIAGNOSIS — R05.9 COUGH: ICD-10-CM

## 2019-02-05 PROCEDURE — 71046 X-RAY EXAM CHEST 2 VIEWS: CPT

## 2019-03-27 ENCOUNTER — OFFICE VISIT (OUTPATIENT)
Dept: ONCOLOGY | Age: 79
End: 2019-03-27
Payer: MEDICARE

## 2019-03-27 ENCOUNTER — HOSPITAL ENCOUNTER (OUTPATIENT)
Dept: INFUSION THERAPY | Age: 79
Discharge: HOME | End: 2019-03-27
Payer: MEDICARE

## 2019-03-27 VITALS
DIASTOLIC BLOOD PRESSURE: 51 MMHG | HEIGHT: 68 IN | HEART RATE: 54 BPM | SYSTOLIC BLOOD PRESSURE: 124 MMHG | BODY MASS INDEX: 29.22 KG/M2 | WEIGHT: 192.8 LBS | TEMPERATURE: 97.3 F | RESPIRATION RATE: 20 BRPM

## 2019-03-27 DIAGNOSIS — D64.9 ANEMIA, UNSPECIFIED TYPE: ICD-10-CM

## 2019-03-27 DIAGNOSIS — D64.9 ANEMIA, UNSPECIFIED TYPE: Primary | ICD-10-CM

## 2019-03-27 LAB
ALBUMIN SERPL-MCNC: 4.1 G/DL (ref 3.5–5.2)
ALP BLD-CCNC: 86 U/L (ref 40–129)
ALT SERPL-CCNC: 27 U/L (ref 0–40)
ANION GAP SERPL CALCULATED.3IONS-SCNC: 10 MMOL/L (ref 7–16)
AST SERPL-CCNC: 26 U/L (ref 0–39)
BASOPHILS ABSOLUTE: 0.11 E9/L (ref 0–0.2)
BASOPHILS RELATIVE PERCENT: 1.7 % (ref 0–2)
BILIRUB SERPL-MCNC: 0.4 MG/DL (ref 0–1.2)
BUN BLDV-MCNC: 30 MG/DL (ref 8–23)
CALCIUM SERPL-MCNC: 9.7 MG/DL (ref 8.6–10.2)
CHLORIDE BLD-SCNC: 105 MMOL/L (ref 98–107)
CO2: 26 MMOL/L (ref 22–29)
CREAT SERPL-MCNC: 1 MG/DL (ref 0.7–1.2)
EOSINOPHILS ABSOLUTE: 0.43 E9/L (ref 0.05–0.5)
EOSINOPHILS RELATIVE PERCENT: 7 % (ref 0–6)
FERRITIN: 111 NG/ML
FOLATE: >20 NG/ML (ref 4.8–24.2)
GFR AFRICAN AMERICAN: >60
GFR NON-AFRICAN AMERICAN: >60 ML/MIN/1.73
GLUCOSE BLD-MCNC: 257 MG/DL (ref 74–99)
HBA1C MFR BLD: 5.6 % (ref 4–5.6)
HCT VFR BLD CALC: 38.3 % (ref 37–54)
HEMOGLOBIN: 12.6 G/DL (ref 12.5–16.5)
IMMATURE RETIC FRACT: 10.7 % (ref 2.3–13.4)
IRON SATURATION: 43 % (ref 20–55)
IRON: 116 MCG/DL (ref 59–158)
LACTATE DEHYDROGENASE: 326 U/L (ref 135–225)
LYMPHOCYTES ABSOLUTE: 1.36 E9/L (ref 1.5–4)
LYMPHOCYTES RELATIVE PERCENT: 21.7 % (ref 20–42)
MCH RBC QN AUTO: 35.4 PG (ref 26–35)
MCHC RBC AUTO-ENTMCNC: 32.9 % (ref 32–34.5)
MCV RBC AUTO: 107.6 FL (ref 80–99.9)
MONOCYTES ABSOLUTE: 0.56 E9/L (ref 0.1–0.95)
MONOCYTES RELATIVE PERCENT: 8.7 % (ref 2–12)
NEUTROPHILS ABSOLUTE: 3.78 E9/L (ref 1.8–7.3)
NEUTROPHILS RELATIVE PERCENT: 60.9 % (ref 43–80)
PDW BLD-RTO: 12.1 FL (ref 11.5–15)
PLATELET # BLD: 194 E9/L (ref 130–450)
PMV BLD AUTO: 11.2 FL (ref 7–12)
POLYCHROMASIA: ABNORMAL
POTASSIUM SERPL-SCNC: 4.9 MMOL/L (ref 3.5–5)
RBC # BLD: 3.56 E12/L (ref 3.8–5.8)
RETIC HGB EQUIVALENT: 40 PG (ref 28.2–36.6)
RETICULOCYTE ABSOLUTE COUNT: 0.11 E12/L
RETICULOCYTE COUNT PCT: 3.1 % (ref 0.4–1.9)
SODIUM BLD-SCNC: 141 MMOL/L (ref 132–146)
TOTAL IRON BINDING CAPACITY: 269 MCG/DL (ref 250–450)
TOTAL PROTEIN: 7 G/DL (ref 6.4–8.3)
VITAMIN B-12: 646 PG/ML (ref 211–946)
WBC # BLD: 6.2 E9/L (ref 4.5–11.5)

## 2019-03-27 PROCEDURE — 80053 COMPREHEN METABOLIC PANEL: CPT

## 2019-03-27 PROCEDURE — 83615 LACTATE (LD) (LDH) ENZYME: CPT

## 2019-03-27 PROCEDURE — 36415 COLL VENOUS BLD VENIPUNCTURE: CPT

## 2019-03-27 PROCEDURE — 83540 ASSAY OF IRON: CPT

## 2019-03-27 PROCEDURE — 85025 COMPLETE CBC W/AUTO DIFF WBC: CPT

## 2019-03-27 PROCEDURE — 83550 IRON BINDING TEST: CPT

## 2019-03-27 PROCEDURE — 85045 AUTOMATED RETICULOCYTE COUNT: CPT

## 2019-03-27 PROCEDURE — 83036 HEMOGLOBIN GLYCOSYLATED A1C: CPT

## 2019-03-27 PROCEDURE — 82746 ASSAY OF FOLIC ACID SERUM: CPT

## 2019-03-27 PROCEDURE — 99212 OFFICE O/P EST SF 10 MIN: CPT | Performed by: INTERNAL MEDICINE

## 2019-03-27 PROCEDURE — 82607 VITAMIN B-12: CPT

## 2019-03-27 PROCEDURE — 82728 ASSAY OF FERRITIN: CPT

## 2019-03-27 RX ORDER — DAPSONE 25 MG/1
25 TABLET ORAL DAILY
COMMUNITY

## 2019-04-04 ENCOUNTER — OFFICE VISIT (OUTPATIENT)
Dept: CARDIOLOGY CLINIC | Age: 79
End: 2019-04-04
Payer: MEDICARE

## 2019-04-04 VITALS
BODY MASS INDEX: 29.1 KG/M2 | SYSTOLIC BLOOD PRESSURE: 100 MMHG | HEART RATE: 61 BPM | HEIGHT: 68 IN | WEIGHT: 192 LBS | DIASTOLIC BLOOD PRESSURE: 50 MMHG

## 2019-04-04 DIAGNOSIS — Z95.1 HX OF CABG: ICD-10-CM

## 2019-04-04 DIAGNOSIS — L13.0 DERMATITIS HERPETIFORMIS: ICD-10-CM

## 2019-04-04 DIAGNOSIS — N18.2 CKD (CHRONIC KIDNEY DISEASE) STAGE 2, GFR 60-89 ML/MIN: ICD-10-CM

## 2019-04-04 DIAGNOSIS — R05.8 COUGH DUE TO ACE INHIBITOR: ICD-10-CM

## 2019-04-04 DIAGNOSIS — I44.0 FIRST DEGREE ATRIOVENTRICULAR BLOCK: ICD-10-CM

## 2019-04-04 DIAGNOSIS — D64.9 ANEMIA, UNSPECIFIED TYPE: ICD-10-CM

## 2019-04-04 DIAGNOSIS — I25.5 ISCHEMIC CARDIOMYOPATHY: ICD-10-CM

## 2019-04-04 DIAGNOSIS — Z79.4 TYPE 2 DIABETES MELLITUS WITH COMPLICATION, WITH LONG-TERM CURRENT USE OF INSULIN (HCC): ICD-10-CM

## 2019-04-04 DIAGNOSIS — I25.2 HX OF NON-ST ELEVATION MYOCARDIAL INFARCTION (NSTEMI): ICD-10-CM

## 2019-04-04 DIAGNOSIS — Z87.898 H/O SYNCOPE: ICD-10-CM

## 2019-04-04 DIAGNOSIS — R59.0 MEDIASTINAL LYMPHADENOPATHY: ICD-10-CM

## 2019-04-04 DIAGNOSIS — I44.7 LBBB (LEFT BUNDLE BRANCH BLOCK): ICD-10-CM

## 2019-04-04 DIAGNOSIS — I73.9 PAD (PERIPHERAL ARTERY DISEASE) (HCC): ICD-10-CM

## 2019-04-04 DIAGNOSIS — I50.42 CHRONIC COMBINED SYSTOLIC AND DIASTOLIC CONGESTIVE HEART FAILURE (HCC): ICD-10-CM

## 2019-04-04 DIAGNOSIS — J42 CHRONIC BRONCHITIS, UNSPECIFIED CHRONIC BRONCHITIS TYPE (HCC): ICD-10-CM

## 2019-04-04 DIAGNOSIS — E11.8 TYPE 2 DIABETES MELLITUS WITH COMPLICATION, WITH LONG-TERM CURRENT USE OF INSULIN (HCC): ICD-10-CM

## 2019-04-04 DIAGNOSIS — R59.0 LYMPHADENOPATHY, HILAR: ICD-10-CM

## 2019-04-04 DIAGNOSIS — Z86.711 HX OF PULMONARY EMBOLUS: ICD-10-CM

## 2019-04-04 DIAGNOSIS — T46.4X5A COUGH DUE TO ACE INHIBITOR: ICD-10-CM

## 2019-04-04 DIAGNOSIS — I25.2 HISTORY OF ACUTE INFERIOR WALL MYOCARDIAL INFARCTION: ICD-10-CM

## 2019-04-04 DIAGNOSIS — I25.10 CAD IN NATIVE ARTERY: Primary | ICD-10-CM

## 2019-04-04 PROCEDURE — 93000 ELECTROCARDIOGRAM COMPLETE: CPT | Performed by: INTERNAL MEDICINE

## 2019-04-04 PROCEDURE — 99213 OFFICE O/P EST LOW 20 MIN: CPT | Performed by: INTERNAL MEDICINE

## 2019-04-04 NOTE — PROGRESS NOTES
OFFICE VISIT        PRIMARY CARE PHYSICIAN:    Poonam Velázquez MD       ALLERGIES / SENSITIVITIES:    Allergies   Allergen Reactions    Coumadin [Warfarin Sodium] Rash        REVIEWED MEDICATIONS:      Current Outpatient Medications:     sacubitril-valsartan (ENTRESTO) 24-26 MG per tablet, Take 1 tablet by mouth 2 times daily, Disp: 180 tablet, Rfl: 3    Fluticasone-Umeclidin-Vilant (TRELEGY ELLIPTA) 100-62.5-25 MCG/INH AEPB, Inhale into the lungs daily, Disp: , Rfl:     dapsone 25 MG tablet, Take 25 mg by mouth daily Indications: 2 TO 3 PO ONCE A DAY, Disp: , Rfl:     furosemide (LASIX) 40 MG tablet, Take 1 tablet by mouth daily, Disp: 90 tablet, Rfl: 3    carvedilol (COREG) 25 MG tablet, Take 1 tablet by mouth 2 times daily, Disp: 180 tablet, Rfl: 3    Cholecalciferol (VITAMIN D3) 5000 units TABS, Take by mouth, Disp: , Rfl:     Insulin Aspart Prot & Aspart (NOVOLOG MIX 70/30 FLEXPEN SC), Inject 25 Units into the skin, Disp: , Rfl:     pantoprazole (PROTONIX) 40 MG tablet, TK 1 T PO BID FOR GERD, Disp: , Rfl: 1    Umeclidinium Bromide (INCRUSE ELLIPTA) 62.5 MCG/INH AEPB, Inhale into the lungs, Disp: , Rfl:     aspirin 81 MG tablet, Take 81 mg by mouth daily, Disp: , Rfl:     fluticasone (FLONASE) 50 MCG/ACT nasal spray, 1 spray by Nasal route 2 times daily, Disp: 1 Bottle, Rfl: 3    simvastatin (ZOCOR) 20 MG tablet, 20 mg daily, Disp: , Rfl:     acetaminophen (TYLENOL) 325 MG tablet, Take 2 tablets by mouth every 4 hours as needed for Pain, Disp: 120 tablet, Rfl: 3    finasteride (PROSCAR) 5 MG tablet, Take 1 tablet by mouth daily, Disp: 30 tablet, Rfl: 3    Multiple Vitamins-Minerals (THERAPEUTIC MULTIVITAMIN-MINERALS) tablet, Take 1 tablet by mouth daily, Disp: 30 tablet, Rfl: 0    albuterol-ipratropium (COMBIVENT RESPIMAT)  MCG/ACT AERS inhaler, Inhale 1 puff into the lungs every 6 hours as needed for Wheezing, Disp: 1 Inhaler, Rfl: 0    Accu-Chek Multiclix Lancets MISC, USE TO TEST 5 TIMES DAILY, Disp: 500 each, Rfl: 1    insulin glargine (LANTUS SOLOSTAR) 100 UNIT/ML injection pen, Inject 25 units nightly - 10 pens, Disp: 10 Pen, Rfl: 1      S: REASON FOR VISIT:    Ischemic cardiomyopathy with severe LV systolic dysfunction and chronic systolic congestive heart failure. Deonte Dias is a pleasant, 43-year-old gentleman with cardiovascular history as described below. He has been stable from a cardiac standpoint since his last office visit in 10/2018. He has been trying to ride his stationary bike almost daily, riding for 10 minutes, then resting for 1 minute, then riding for another 10-20 minutes. He denies chest pain or significant dyspnea with his daily activities. He denies orthopnea, PND's or significant lower extremity swelling. He denies palpitations, dizziness, presyncope or syncope. REVIEW OF SYSTEMS:    CONSTITUTIONAL:  Denies fevers, chills, night sweats or fatigue. HEENT:  Denies any unusual headaches.  Denies changes in hearing or vision.  Denies dysphagia, hoarseness, hemoptysis, hematemesis or epistaxis. ENDOCRINE:  Denies polyphagia, polydipsia or polyuria.  Denies heat or cold intolerance. MUSCULOSKELETAL:  Denies arthralgias or myalgias. SKIN:  Denies rashes, ulcers or itching. HEME/LYMPH:  Denies any palpable lymph nodes, bleeding or easy bruisability. HEART:  As above. LUNGS:  Denies cough or sputum production. GI: Denies abdominal pain, nausea, vomiting,diarrhea, constipation, rectal bleeding or tarry stools. :  Denies hematuria or dysuria. PSYCHIATRIC:  Denies mood changes, anxiety or depression. NEUROLOGIC:  Denies memory loss, motor weakness, numbness, tingling or tremors.        CARDIOVASCULAR HISTORY:   1.  Coronary artery disease/ischemic cardiomyopathy/chronic combined systolic and diastolic congestive heart failure (with recurrent decompensations):  a.  Inferior wall myocardial infarction in 1992 for which he received thrombolytic therapy.    b.  February 1996 ? non Q wave myocardial infarction. c.  February 1996 Cardiac catheterization mild to moderate LV dysfunction. Severe five vessel coronary artery disease. tomer. Augustus Baez 1996: Coronary artery bypass surgery with a LIMA to the LAD, SVG to diagonal, SVG to obtuse marginal, SVG to LCX and SVG to PDA.   e. 12/14/12: Sharon Overbrook nuclear stress test showed a large transmural myocardial infarction involving the anterolateral wall, the lateral wall, the inferolateral wall and the basal anterior wall with a small area of mild residual stress-induced ischemia involving the basal anterior wall with the left ventricle appearing severely dilated and with the gated views showing apical akinesis, septal dyskinesis, anteroapical dyskinesis, severe hypokinesis of the inferior wall and also the rest of the interventricular septum with moderately to severely impaired global left ventricular systolic function with a computer-calculated ejection fraction of 30%. [facility] catheterization done on 01/20/14 showed occluded native arteries. Patent LIMA to LAD with 50-60% disease of the LAD shortly after the distal anastomosis. Patent SVG to LCX OM1. Patent SVG to LCX OM2. Occluded SVG to diagonal branch. Patent SVG to distal RCA. Systemic hypertension. Elevated left ventricular end-diastolic pressure. Closure of the right femoral artery access site with Exoseal device.   g. 12/04/14 Lexiscan nuclear stress test was an abnormal study showing an infarction involving the anterolateral wall, inferolateral wall, lateral wall and the basal anterior wall with no evidence of any significant residual stress-induced ischemia with the left ventricle appearing moderately-to-severely dilated with the gated views showing apical dyskinesis, septal akinesis and inferior wall hypokinesis with a calculated ejection fraction of 32%.  h. ACE inhibitor-induced cough.   Janet Iniguez NSTEMI in 06/2017 (elevated cardiac enzymes in the context of acute respiratory failure, CHF and acute kidney injury with severe hyperglycemia).    j. 12/04/14: Echocardiogram showed a mildly dilated left ventricle with akinesis of the posterior wall and the basal lateral wall with severe hypokinesis of the rest of the inferior wall and the lateral wall and hypokinesis of the interventricular septum, left ventricular apex and the anterior wall with an estimated ejection fraction of 30-35% with stage I left ventricular diastolic dysfunction. Normal right ventricular size and function. Mildly dilated left atrium. Minimally sclerotic aortic valve without stenosis or insufficiency. Mild mitral regurgitation. Pedroe Cardinal done on 06/12/2017 was read by Dr. Irene Brownlee as showing a moderately dilated left ventricle with mild concentric LVH with paradoxical septal motion with severely impaired left ventricular systolic function an estimated EF of 20-25% with stage III left ventricular diastolic dysfunction.  Normal right ventricular size with reduced right ventricular systolic function.  Moderate mitral regurgitation.  Mild-to-moderate tricuspid regurgitation.  Mild pulmonary hypertension. 2. Diabetes mellitus diagnosed in 1994; on Insulin:    a.  Diabetic retinopathy and macular edema status post bilateral eye laser therapy. 3. History of tobacco abuse; quit 1992.   4. ICD implantation was discussed with patient on more than one occasion: He politely declined. 5. Chronic kidney disease. Acute renal failure, prerenal azotemia and hyperkalemia with the addition of Lasix and spironolactone in 1/2010 / 2/2010. Acute renal failure/prerenal azotemia in 06/2017.  6. Left bundle branch block.    7. Peripheral arterial disease:    a.  Lower extremity arterial studies done at [facility]- on 6/11/14 were read as suggestive of bilateral calcified arterial sclerosis with no major arterial segmental stenosis of the bilateral lower extremities at rest with evidence of bilateral rubs.   Abdomen:       Soft, nontender without organomegaly or masses. No bruits. Normal bowel sounds. Extremities:     Trace to 1+ edema. Varicosities noted. Femoral pulses diminished but palpable. Skin:                Normal turgor. No ulcers or rashes noted. Neuro:             Oriented x3. No motor or sensory deficit detected.        REVIEW OF DIAGNOSTIC TESTS:    1. Blood tests from 3/27/2019 reviewed:  BUN 30, creatinine 1.0, potassium 4.9, GFR > 60, LFT's normal, hemoglobin A1C 5.6, hemoglobin 12.6, hematocrit 38.3. 2. EKG done today showed sinus rhythm with 1st degree AV block and left bundle branch block. ASSESSMENT / DIAGNOSIS:   1.  Ischemic cardiomyopathy with chronic combined systolic and diastolic congestive heart failure which appears compensated currently with no signs of fluid overload.    2.  Chronic kidney disease. 3.  Diabetes mellitus: Poorly controlled. 4.  First degree AV block. 5.  Left bundle branch block. 6.  Patient refuses ICD implantation for primary prevention. 7.  Peripheral arterial disease. 8.  ACE inhibitor-induced cough. 9.  Pulmonary embolus in 06/2017. 10.  Hilar and mediastinal lymphadenopathy on CT scan of chest in 06/2017. 11.  Borderline low blood pressure. 12.  Syncopal episode in 10/2017: Vasovagal reaction, orthostatic hypotension, dehydration, acute cystitis.    13.  COPD. 14.  Dermatitis herpetiformis. 13.  Anemia of undetermined etiology diagnosed in 02/2018, with history of blood transfusion. Stable hemoglobin and hematocrit more recently.          TREATMENT PLAN:  1. Reassure. 2.  Continue current medications. 3.  Patient advised to try to remain as active as can be. 4.  Follow up with Cardiology in 6 months or on a prn basis. [place] [address] [place] [address].  [facility] [id number]  [phone]

## 2019-04-16 ENCOUNTER — TELEPHONE (OUTPATIENT)
Dept: CARDIOLOGY CLINIC | Age: 79
End: 2019-04-16

## 2019-09-25 NOTE — PROGRESS NOTES
[address] [facility] [facility]        Pt Name: Newton Malagon  YOB: 1940  Date of evaluation: 9/25/2019  Primary Care Physician: Brittni Simms MD  Reason for evaluation:   Chief Complaint   Patient presents with    Anemia    6 Month Follow-Up        Subjective: Here for follow-up. Feels well today. On Dapsone,  initiated by his dermatologist   His [address] (dermatitis herpetiformis) is  controlled  His dapsone dose had to be increased to 25 mg twice daily by dermatology  He is now on Entresto by cardiology for CHF    OBJECTIVE:  VITALS:  height is 5' 8\" (1.727 m) and weight is 187 lb 3.2 oz (84.9 kg). His temporal temperature is 96.6 °F (35.9 °C). His blood pressure is 117/58 (abnormal) and his pulse is 56. His oxygen saturation is 95%. Physical Exam:  Performance Status: 0  Well developed, well nourished male  General: AAO to person, place, time, in no acute distress, pleasant   Head and neck : PERRLA, EOMI . Sclera non icteric. Oropharynx : Clear  Neck: no JVD,  no adenopathy,  Heart: Regular rate and regular rhythm, no murmur. Mid sternotomy scar noted  Lungs: Few scattered expiratory rhonchi  Abdomen: Soft, non-tender;no masses, no organomegaly  Extremities: No edema,no cyanosis,   Neurologic:Cranial nerves grossly intact. No focal motor or sensory deficits . Skin:  Nodular rash on extremities consistent with DH      Medications  Prior to Admission medications    Medication Sig Start Date End Date Taking?  Authorizing Provider   carvedilol (COREG) 25 MG tablet Take 1 tablet by mouth 2 times daily 7/12/19  Yes Faustina Taylor MD   sacubitril-valsartan (ENTRESTO) 24-26 MG per tablet Take 1 tablet by mouth 2 times daily 4/5/19  Yes Pao Bess MD   Fluticasone-Umeclidin-Vilant (Parthenia Cobia) 682-95.2-12 MCG/INH AEPB Inhale into the lungs daily   Yes Historical Provider, MD   dapsone 25 MG tablet Take 25 mg by mouth daily Indications: 2 TO 3 PO ONCE A DAY Yes Historical Provider, MD   furosemide (LASIX) 40 MG tablet Take 1 tablet by mouth daily 11/28/18  Yes Kari Gandhi MD   Cholecalciferol (VITAMIN D3) 5000 units TABS Take by mouth   Yes Historical Provider, MD   Insulin Aspart Prot & Aspart (NOVOLOG MIX 70/30 FLEXPEN SC) Inject 25 Units into the skin   Yes Historical Provider, MD   pantoprazole (PROTONIX) 40 MG tablet TK 1 T PO BID FOR GERD 12/21/17  Yes Historical Provider, MD   Umeclidinium Bromide (INCRUSE ELLIPTA) 62.5 MCG/INH AEPB Inhale into the lungs   Yes Historical Provider, MD   aspirin 81 MG tablet Take 81 mg by mouth daily   Yes Historical Provider, MD   fluticasone (FLONASE) 50 MCG/ACT nasal spray 1 spray by Nasal route 2 times daily 6/19/17  Yes Lorna Rivera MD   simvastatin (ZOCOR) 20 MG tablet 20 mg daily 11/26/16  Yes Historical Provider, MD   acetaminophen (TYLENOL) 325 MG tablet Take 2 tablets by mouth every 4 hours as needed for Pain 12/26/16  Yes Chanelle Beck MD   finasteride (PROSCAR) 5 MG tablet Take 1 tablet by mouth daily 12/26/16  Yes Chanelle Beck MD   Multiple Vitamins-Minerals (THERAPEUTIC MULTIVITAMIN-MINERALS) tablet Take 1 tablet by mouth daily 12/26/16  Yes Duncan Hernandes MD   albuterol-ipratropium (COMBIVENT RESPIMAT)  MCG/ACT AERS inhaler Inhale 1 puff into the lungs every 6 hours as needed for Wheezing 12/26/16  Yes Duncan Hernandes MD   Accu-Chek Multiclix Lancets MISC USE TO TEST 5 TIMES DAILY 7/22/14  Yes Jaylen Garay MD   insulin glargine (LANTUS SOLOSTAR) 100 UNIT/ML injection pen Inject 25 units nightly - 10 pens 6/30/14  Yes Jaylen Garay MD    Scheduled Meds:  Continuous Infusions:  PRN Meds:.        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 His slightly elevated MCV is suggestive of very mild compensated hemolysis  He may continue on dapsone. He has been reassured. Hgb was 12.6 on 3/27/19. He has mild compensated hemolysis and he is to continue on present dose of dapsone    Hgb is 11.6  today---9/25/19. Folic acid will be added at a dose of 1 mg daily    Alex Mares M.D., F.A.C.P.   Electronically signed 9/25/2019 at 2:52 PM

## 2020-01-01 ENCOUNTER — TELEPHONE (OUTPATIENT)
Dept: CARDIOLOGY CLINIC | Age: 80
End: 2020-01-01

## 2020-01-01 ENCOUNTER — VIRTUAL VISIT (OUTPATIENT)
Dept: CARDIOLOGY CLINIC | Age: 80
End: 2020-01-01
Payer: MEDICARE

## 2020-01-01 ENCOUNTER — HOSPITAL ENCOUNTER (INPATIENT)
Age: 80
LOS: 2 days | Discharge: HOME | DRG: 291 | End: 2020-04-11
Attending: EMERGENCY MEDICINE | Admitting: INTERNAL MEDICINE
Payer: MEDICARE

## 2020-01-01 ENCOUNTER — CLINICAL DOCUMENTATION (OUTPATIENT)
Dept: INFUSION THERAPY | Age: 80
End: 2020-01-01

## 2020-01-01 ENCOUNTER — APPOINTMENT (OUTPATIENT)
Dept: NUCLEAR MEDICINE | Age: 80
DRG: 291 | End: 2020-01-01
Payer: MEDICARE

## 2020-01-01 ENCOUNTER — APPOINTMENT (OUTPATIENT)
Dept: GENERAL RADIOLOGY | Age: 80
DRG: 291 | End: 2020-01-01
Payer: MEDICARE

## 2020-01-01 VITALS
OXYGEN SATURATION: 97 % | HEIGHT: 68 IN | BODY MASS INDEX: 28.97 KG/M2 | HEART RATE: 65 BPM | WEIGHT: 191.13 LBS | TEMPERATURE: 97.5 F | SYSTOLIC BLOOD PRESSURE: 120 MMHG | RESPIRATION RATE: 17 BRPM | DIASTOLIC BLOOD PRESSURE: 59 MMHG

## 2020-01-01 VITALS
BODY MASS INDEX: 28.79 KG/M2 | SYSTOLIC BLOOD PRESSURE: 126 MMHG | WEIGHT: 190 LBS | DIASTOLIC BLOOD PRESSURE: 60 MMHG | HEIGHT: 68 IN | HEART RATE: 74 BPM

## 2020-01-01 LAB
ALBUMIN SERPL-MCNC: 3.6 G/DL (ref 3.5–5.2)
ALP BLD-CCNC: 74 U/L (ref 40–129)
ALT SERPL-CCNC: 30 U/L (ref 0–40)
ANION GAP SERPL CALCULATED.3IONS-SCNC: 11 MMOL/L (ref 7–16)
ANION GAP SERPL CALCULATED.3IONS-SCNC: 12 MMOL/L (ref 7–16)
ANION GAP SERPL CALCULATED.3IONS-SCNC: 9 MMOL/L (ref 7–16)
ANISOCYTOSIS: ABNORMAL
AST SERPL-CCNC: 25 U/L (ref 0–39)
BASOPHILS ABSOLUTE: 0.05 E9/L (ref 0–0.2)
BASOPHILS ABSOLUTE: 0.06 E9/L (ref 0–0.2)
BASOPHILS RELATIVE PERCENT: 0.9 % (ref 0–2)
BASOPHILS RELATIVE PERCENT: 0.9 % (ref 0–2)
BILIRUB SERPL-MCNC: 0.6 MG/DL (ref 0–1.2)
BUN BLDV-MCNC: 24 MG/DL (ref 8–23)
BUN BLDV-MCNC: 27 MG/DL (ref 8–23)
BUN BLDV-MCNC: 32 MG/DL (ref 8–23)
CALCIUM SERPL-MCNC: 8.8 MG/DL (ref 8.6–10.2)
CALCIUM SERPL-MCNC: 9.2 MG/DL (ref 8.6–10.2)
CALCIUM SERPL-MCNC: 9.3 MG/DL (ref 8.6–10.2)
CHLORIDE BLD-SCNC: 105 MMOL/L (ref 98–107)
CHLORIDE BLD-SCNC: 107 MMOL/L (ref 98–107)
CHLORIDE BLD-SCNC: 107 MMOL/L (ref 98–107)
CHOLESTEROL, TOTAL: 145 MG/DL (ref 0–199)
CO2: 21 MMOL/L (ref 22–29)
CO2: 23 MMOL/L (ref 22–29)
CO2: 26 MMOL/L (ref 22–29)
CREAT SERPL-MCNC: 1 MG/DL (ref 0.7–1.2)
CREAT SERPL-MCNC: 1.1 MG/DL (ref 0.7–1.2)
CREAT SERPL-MCNC: 1.2 MG/DL (ref 0.7–1.2)
EKG ATRIAL RATE: 78 BPM
EKG P AXIS: 55 DEGREES
EKG P-R INTERVAL: 240 MS
EKG Q-T INTERVAL: 444 MS
EKG QRS DURATION: 198 MS
EKG QTC CALCULATION (BAZETT): 506 MS
EKG R AXIS: -7 DEGREES
EKG T AXIS: 136 DEGREES
EKG VENTRICULAR RATE: 78 BPM
EOSINOPHILS ABSOLUTE: 0.16 E9/L (ref 0.05–0.5)
EOSINOPHILS ABSOLUTE: 0.18 E9/L (ref 0.05–0.5)
EOSINOPHILS RELATIVE PERCENT: 2.7 % (ref 0–6)
EOSINOPHILS RELATIVE PERCENT: 2.7 % (ref 0–6)
GFR AFRICAN AMERICAN: >60
GFR NON-AFRICAN AMERICAN: 58 ML/MIN/1.73
GFR NON-AFRICAN AMERICAN: >60 ML/MIN/1.73
GFR NON-AFRICAN AMERICAN: >60 ML/MIN/1.73
GLUCOSE BLD-MCNC: 215 MG/DL (ref 74–99)
GLUCOSE BLD-MCNC: 251 MG/DL (ref 74–99)
GLUCOSE BLD-MCNC: 280 MG/DL (ref 74–99)
HBA1C MFR BLD: 6.7 % (ref 4–5.6)
HCT VFR BLD CALC: 34.6 % (ref 37–54)
HCT VFR BLD CALC: 34.6 % (ref 37–54)
HCT VFR BLD CALC: 36.5 % (ref 37–54)
HDLC SERPL-MCNC: 45 MG/DL
HEMOGLOBIN: 11.4 G/DL (ref 12.5–16.5)
HEMOGLOBIN: 11.4 G/DL (ref 12.5–16.5)
HEMOGLOBIN: 11.8 G/DL (ref 12.5–16.5)
IMMATURE GRANULOCYTES #: 0.04 E9/L
IMMATURE GRANULOCYTES %: 0.6 % (ref 0–5)
LDL CHOLESTEROL CALCULATED: 78 MG/DL (ref 0–99)
LV EF: 15 %
LV EF: 23 %
LVEF MODALITY: NORMAL
LVEF MODALITY: NORMAL
LYMPHOCYTES ABSOLUTE: 1.19 E9/L (ref 1.5–4)
LYMPHOCYTES ABSOLUTE: 1.42 E9/L (ref 1.5–4)
LYMPHOCYTES RELATIVE PERCENT: 17.5 % (ref 20–42)
LYMPHOCYTES RELATIVE PERCENT: 24.1 % (ref 20–42)
MCH RBC QN AUTO: 35.3 PG (ref 26–35)
MCH RBC QN AUTO: 35.4 PG (ref 26–35)
MCH RBC QN AUTO: 35.7 PG (ref 26–35)
MCHC RBC AUTO-ENTMCNC: 32.3 % (ref 32–34.5)
MCHC RBC AUTO-ENTMCNC: 32.9 % (ref 32–34.5)
MCHC RBC AUTO-ENTMCNC: 32.9 % (ref 32–34.5)
MCV RBC AUTO: 107.5 FL (ref 80–99.9)
MCV RBC AUTO: 108.5 FL (ref 80–99.9)
MCV RBC AUTO: 109.3 FL (ref 80–99.9)
METER GLUCOSE: 116 MG/DL (ref 74–99)
METER GLUCOSE: 132 MG/DL (ref 74–99)
METER GLUCOSE: 191 MG/DL (ref 74–99)
METER GLUCOSE: 208 MG/DL (ref 74–99)
METER GLUCOSE: 210 MG/DL (ref 74–99)
METER GLUCOSE: 241 MG/DL (ref 74–99)
METER GLUCOSE: 260 MG/DL (ref 74–99)
METER GLUCOSE: 316 MG/DL (ref 74–99)
METER GLUCOSE: 367 MG/DL (ref 74–99)
MONOCYTES ABSOLUTE: 0.41 E9/L (ref 0.1–0.95)
MONOCYTES ABSOLUTE: 0.66 E9/L (ref 0.1–0.95)
MONOCYTES RELATIVE PERCENT: 7.1 % (ref 2–12)
MONOCYTES RELATIVE PERCENT: 9.7 % (ref 2–12)
NEUTROPHILS ABSOLUTE: 3.84 E9/L (ref 1.8–7.3)
NEUTROPHILS ABSOLUTE: 4.66 E9/L (ref 1.8–7.3)
NEUTROPHILS RELATIVE PERCENT: 65.2 % (ref 43–80)
NEUTROPHILS RELATIVE PERCENT: 68.6 % (ref 43–80)
OVALOCYTES: ABNORMAL
PDW BLD-RTO: 13.3 FL (ref 11.5–15)
PDW BLD-RTO: 13.4 FL (ref 11.5–15)
PDW BLD-RTO: 13.7 FL (ref 11.5–15)
PLATELET # BLD: 159 E9/L (ref 130–450)
PLATELET # BLD: 162 E9/L (ref 130–450)
PLATELET # BLD: 165 E9/L (ref 130–450)
PMV BLD AUTO: 11.9 FL (ref 7–12)
PMV BLD AUTO: 12.2 FL (ref 7–12)
PMV BLD AUTO: 12.2 FL (ref 7–12)
POIKILOCYTES: ABNORMAL
POTASSIUM SERPL-SCNC: 4.5 MMOL/L (ref 3.5–5)
POTASSIUM SERPL-SCNC: 4.6 MMOL/L (ref 3.5–5)
POTASSIUM SERPL-SCNC: 4.6 MMOL/L (ref 3.5–5)
PRO-BNP: 2791 PG/ML (ref 0–450)
RBC # BLD: 3.19 E12/L (ref 3.8–5.8)
RBC # BLD: 3.22 E12/L (ref 3.8–5.8)
RBC # BLD: 3.34 E12/L (ref 3.8–5.8)
SODIUM BLD-SCNC: 138 MMOL/L (ref 132–146)
SODIUM BLD-SCNC: 141 MMOL/L (ref 132–146)
SODIUM BLD-SCNC: 142 MMOL/L (ref 132–146)
TOTAL PROTEIN: 6.1 G/DL (ref 6.4–8.3)
TRIGL SERPL-MCNC: 112 MG/DL (ref 0–149)
TROPONIN: 0.02 NG/ML (ref 0–0.03)
TROPONIN: 0.03 NG/ML (ref 0–0.03)
VLDLC SERPL CALC-MCNC: 22 MG/DL
WBC # BLD: 5.9 E9/L (ref 4.5–11.5)
WBC # BLD: 6.8 E9/L (ref 4.5–11.5)
WBC # BLD: 9.2 E9/L (ref 4.5–11.5)

## 2020-01-01 PROCEDURE — 80048 BASIC METABOLIC PNL TOTAL CA: CPT

## 2020-01-01 PROCEDURE — 36415 COLL VENOUS BLD VENIPUNCTURE: CPT

## 2020-01-01 PROCEDURE — 6370000000 HC RX 637 (ALT 250 FOR IP): Performed by: INTERNAL MEDICINE

## 2020-01-01 PROCEDURE — 85027 COMPLETE CBC AUTOMATED: CPT

## 2020-01-01 PROCEDURE — 96372 THER/PROPH/DIAG INJ SC/IM: CPT

## 2020-01-01 PROCEDURE — G0378 HOSPITAL OBSERVATION PER HR: HCPCS

## 2020-01-01 PROCEDURE — 82962 GLUCOSE BLOOD TEST: CPT

## 2020-01-01 PROCEDURE — 99442 PR PHYS/QHP TELEPHONE EVALUATION 11-20 MIN: CPT | Performed by: INTERNAL MEDICINE

## 2020-01-01 PROCEDURE — 6360000002 HC RX W HCPCS: Performed by: INTERNAL MEDICINE

## 2020-01-01 PROCEDURE — 99285 EMERGENCY DEPT VISIT HI MDM: CPT

## 2020-01-01 PROCEDURE — 85025 COMPLETE CBC W/AUTO DIFF WBC: CPT

## 2020-01-01 PROCEDURE — 94640 AIRWAY INHALATION TREATMENT: CPT

## 2020-01-01 PROCEDURE — 97162 PT EVAL MOD COMPLEX 30 MIN: CPT | Performed by: PHYSICAL THERAPIST

## 2020-01-01 PROCEDURE — 6370000000 HC RX 637 (ALT 250 FOR IP): Performed by: EMERGENCY MEDICINE

## 2020-01-01 PROCEDURE — APPSS60 APP SPLIT SHARED TIME 46-60 MINUTES: Performed by: PHYSICIAN ASSISTANT

## 2020-01-01 PROCEDURE — A9500 TC99M SESTAMIBI: HCPCS | Performed by: RADIOLOGY

## 2020-01-01 PROCEDURE — 94664 DEMO&/EVAL PT USE INHALER: CPT

## 2020-01-01 PROCEDURE — 6360000004 HC RX CONTRAST MEDICATION: Performed by: PHYSICIAN ASSISTANT

## 2020-01-01 PROCEDURE — 84484 ASSAY OF TROPONIN QUANT: CPT

## 2020-01-01 PROCEDURE — 1200000000 HC SEMI PRIVATE

## 2020-01-01 PROCEDURE — 71045 X-RAY EXAM CHEST 1 VIEW: CPT

## 2020-01-01 PROCEDURE — 80053 COMPREHEN METABOLIC PANEL: CPT

## 2020-01-01 PROCEDURE — C8929 TTE W OR WO FOL WCON,DOPPLER: HCPCS

## 2020-01-01 PROCEDURE — 83880 ASSAY OF NATRIURETIC PEPTIDE: CPT

## 2020-01-01 PROCEDURE — 83036 HEMOGLOBIN GLYCOSYLATED A1C: CPT

## 2020-01-01 PROCEDURE — 93016 CV STRESS TEST SUPVJ ONLY: CPT | Performed by: INTERNAL MEDICINE

## 2020-01-01 PROCEDURE — 78452 HT MUSCLE IMAGE SPECT MULT: CPT

## 2020-01-01 PROCEDURE — 93018 CV STRESS TEST I&R ONLY: CPT | Performed by: INTERNAL MEDICINE

## 2020-01-01 PROCEDURE — 80061 LIPID PANEL: CPT

## 2020-01-01 PROCEDURE — 93017 CV STRESS TEST TRACING ONLY: CPT

## 2020-01-01 PROCEDURE — 96360 HYDRATION IV INFUSION INIT: CPT

## 2020-01-01 PROCEDURE — 3430000000 HC RX DIAGNOSTIC RADIOPHARMACEUTICAL: Performed by: RADIOLOGY

## 2020-01-01 PROCEDURE — 2580000003 HC RX 258

## 2020-01-01 PROCEDURE — 93005 ELECTROCARDIOGRAM TRACING: CPT | Performed by: EMERGENCY MEDICINE

## 2020-01-01 PROCEDURE — 99223 1ST HOSP IP/OBS HIGH 75: CPT | Performed by: INTERNAL MEDICINE

## 2020-01-01 RX ORDER — FUROSEMIDE 40 MG/1
40 TABLET ORAL DAILY
Status: DISCONTINUED | OUTPATIENT
Start: 2020-01-01 | End: 2020-01-01 | Stop reason: HOSPADM

## 2020-01-01 RX ORDER — ARFORMOTEROL TARTRATE 15 UG/2ML
15 SOLUTION RESPIRATORY (INHALATION) 2 TIMES DAILY
Qty: 120 ML | Refills: 3 | OUTPATIENT
Start: 2020-01-01

## 2020-01-01 RX ORDER — 0.9 % SODIUM CHLORIDE 0.9 %
500 INTRAVENOUS SOLUTION INTRAVENOUS ONCE
Status: COMPLETED | OUTPATIENT
Start: 2020-01-01 | End: 2020-01-01

## 2020-01-01 RX ORDER — ACETAMINOPHEN 325 MG/1
650 TABLET ORAL EVERY 4 HOURS PRN
Status: DISCONTINUED | OUTPATIENT
Start: 2020-01-01 | End: 2020-01-01 | Stop reason: HOSPADM

## 2020-01-01 RX ORDER — FOLIC ACID 1 MG/1
1 TABLET ORAL DAILY
Qty: 90 TABLET | Refills: 3 | Status: SHIPPED | OUTPATIENT
Start: 2020-01-01 | End: 2021-04-26

## 2020-01-01 RX ORDER — FINASTERIDE 5 MG/1
5 TABLET, FILM COATED ORAL DAILY
Status: DISCONTINUED | OUTPATIENT
Start: 2020-01-01 | End: 2020-01-01 | Stop reason: HOSPADM

## 2020-01-01 RX ORDER — DAPSONE 25 MG/1
25 TABLET ORAL DAILY
Status: DISCONTINUED | OUTPATIENT
Start: 2020-01-01 | End: 2020-01-01 | Stop reason: HOSPADM

## 2020-01-01 RX ORDER — ASPIRIN 81 MG/1
81 TABLET, CHEWABLE ORAL DAILY
Status: DISCONTINUED | OUTPATIENT
Start: 2020-01-01 | End: 2020-01-01 | Stop reason: HOSPADM

## 2020-01-01 RX ORDER — ISOSORBIDE MONONITRATE 60 MG/1
60 TABLET, EXTENDED RELEASE ORAL DAILY
Qty: 30 TABLET | Refills: 3 | OUTPATIENT
Start: 2020-01-01

## 2020-01-01 RX ORDER — SIMVASTATIN 20 MG
20 TABLET ORAL DAILY
Status: DISCONTINUED | OUTPATIENT
Start: 2020-01-01 | End: 2020-01-01 | Stop reason: CLARIF

## 2020-01-01 RX ORDER — BUDESONIDE 0.25 MG/2ML
0.25 INHALANT ORAL 2 TIMES DAILY
Status: DISCONTINUED | OUTPATIENT
Start: 2020-01-01 | End: 2020-01-01 | Stop reason: HOSPADM

## 2020-01-01 RX ORDER — ATORVASTATIN CALCIUM 10 MG/1
10 TABLET, FILM COATED ORAL DAILY
Status: DISCONTINUED | OUTPATIENT
Start: 2020-01-01 | End: 2020-01-01 | Stop reason: HOSPADM

## 2020-01-01 RX ORDER — BUDESONIDE 0.25 MG/2ML
250 INHALANT ORAL 2 TIMES DAILY
Qty: 60 AMPULE | Refills: 3 | OUTPATIENT
Start: 2020-01-01

## 2020-01-01 RX ORDER — ALBUTEROL SULFATE 90 UG/1
2 AEROSOL, METERED RESPIRATORY (INHALATION) EVERY 6 HOURS PRN
COMMUNITY

## 2020-01-01 RX ORDER — ISOSORBIDE MONONITRATE 60 MG/1
60 TABLET, EXTENDED RELEASE ORAL DAILY
Qty: 90 TABLET | Refills: 3 | OUTPATIENT
Start: 2020-01-01

## 2020-01-01 RX ORDER — FLUTICASONE PROPIONATE 50 MCG
1 SPRAY, SUSPENSION (ML) NASAL DAILY PRN
Status: DISCONTINUED | OUTPATIENT
Start: 2020-01-01 | End: 2020-01-01 | Stop reason: HOSPADM

## 2020-01-01 RX ORDER — ASPIRIN 81 MG/1
243 TABLET, CHEWABLE ORAL ONCE
Status: COMPLETED | OUTPATIENT
Start: 2020-01-01 | End: 2020-01-01

## 2020-01-01 RX ORDER — SACUBITRIL AND VALSARTAN 24; 26 MG/1; MG/1
1 TABLET, FILM COATED ORAL 2 TIMES DAILY
Qty: 180 TABLET | Refills: 3 | Status: SHIPPED | OUTPATIENT
Start: 2020-01-01

## 2020-01-01 RX ORDER — FOLIC ACID 1 MG/1
1 TABLET ORAL DAILY
Status: DISCONTINUED | OUTPATIENT
Start: 2020-01-01 | End: 2020-01-01 | Stop reason: HOSPADM

## 2020-01-01 RX ORDER — NICOTINE POLACRILEX 4 MG
15 LOZENGE BUCCAL PRN
Status: DISCONTINUED | OUTPATIENT
Start: 2020-01-01 | End: 2020-01-01 | Stop reason: HOSPADM

## 2020-01-01 RX ORDER — ISOSORBIDE MONONITRATE 60 MG/1
60 TABLET, EXTENDED RELEASE ORAL DAILY
Status: DISCONTINUED | OUTPATIENT
Start: 2020-01-01 | End: 2020-01-01 | Stop reason: HOSPADM

## 2020-01-01 RX ORDER — DEXTROSE MONOHYDRATE 25 G/50ML
12.5 INJECTION, SOLUTION INTRAVENOUS PRN
Status: DISCONTINUED | OUTPATIENT
Start: 2020-01-01 | End: 2020-01-01 | Stop reason: HOSPADM

## 2020-01-01 RX ORDER — INSULIN GLARGINE 100 [IU]/ML
25 INJECTION, SOLUTION SUBCUTANEOUS NIGHTLY
Status: DISCONTINUED | OUTPATIENT
Start: 2020-01-01 | End: 2020-01-01 | Stop reason: HOSPADM

## 2020-01-01 RX ORDER — CARVEDILOL 25 MG/1
25 TABLET ORAL 2 TIMES DAILY
Status: DISCONTINUED | OUTPATIENT
Start: 2020-01-01 | End: 2020-01-01 | Stop reason: HOSPADM

## 2020-01-01 RX ORDER — SODIUM CHLORIDE 9 MG/ML
INJECTION, SOLUTION INTRAVENOUS
Status: COMPLETED
Start: 2020-01-01 | End: 2020-01-01

## 2020-01-01 RX ORDER — ARFORMOTEROL TARTRATE 15 UG/2ML
15 SOLUTION RESPIRATORY (INHALATION) 2 TIMES DAILY
Status: DISCONTINUED | OUTPATIENT
Start: 2020-01-01 | End: 2020-01-01 | Stop reason: HOSPADM

## 2020-01-01 RX ORDER — DEXTROSE MONOHYDRATE 50 MG/ML
100 INJECTION, SOLUTION INTRAVENOUS PRN
Status: DISCONTINUED | OUTPATIENT
Start: 2020-01-01 | End: 2020-01-01 | Stop reason: HOSPADM

## 2020-01-01 RX ORDER — M-VIT,TX,IRON,MINS/CALC/FOLIC 27MG-0.4MG
1 TABLET ORAL DAILY
Status: DISCONTINUED | OUTPATIENT
Start: 2020-01-01 | End: 2020-01-01 | Stop reason: HOSPADM

## 2020-01-01 RX ORDER — INSULIN LISPRO 100 [IU]/ML
INJECTION, SOLUTION INTRAVENOUS; SUBCUTANEOUS
COMMUNITY
Start: 2020-01-01

## 2020-01-01 RX ADMIN — SACUBITRIL AND VALSARTAN 1 TABLET: 24; 26 TABLET, FILM COATED ORAL at 14:25

## 2020-01-01 RX ADMIN — CARVEDILOL 25 MG: 25 TABLET, FILM COATED ORAL at 17:23

## 2020-01-01 RX ADMIN — BUDESONIDE 250 MCG: 0.25 INHALANT RESPIRATORY (INHALATION) at 16:23

## 2020-01-01 RX ADMIN — INSULIN LISPRO 4 UNITS: 100 INJECTION, SOLUTION INTRAVENOUS; SUBCUTANEOUS at 17:21

## 2020-01-01 RX ADMIN — REGADENOSON 0.4 MG: 0.08 INJECTION, SOLUTION INTRAVENOUS at 10:51

## 2020-01-01 RX ADMIN — Medication 30 MILLICURIE: at 10:52

## 2020-01-01 RX ADMIN — ARFORMOTEROL TARTRATE 15 MCG: 15 SOLUTION RESPIRATORY (INHALATION) at 06:03

## 2020-01-01 RX ADMIN — ARFORMOTEROL TARTRATE 15 MCG: 15 SOLUTION RESPIRATORY (INHALATION) at 18:13

## 2020-01-01 RX ADMIN — INSULIN LISPRO 4 UNITS: 100 INJECTION, SOLUTION INTRAVENOUS; SUBCUTANEOUS at 17:04

## 2020-01-01 RX ADMIN — Medication 10 MILLICURIE: at 09:09

## 2020-01-01 RX ADMIN — IPRATROPIUM BROMIDE 0.5 MG: 0.5 SOLUTION RESPIRATORY (INHALATION) at 14:42

## 2020-01-01 RX ADMIN — CARVEDILOL 25 MG: 25 TABLET, FILM COATED ORAL at 18:41

## 2020-01-01 RX ADMIN — ATORVASTATIN CALCIUM 10 MG: 10 TABLET, FILM COATED ORAL at 08:40

## 2020-01-01 RX ADMIN — BUDESONIDE 250 MCG: 0.25 INHALANT RESPIRATORY (INHALATION) at 05:59

## 2020-01-01 RX ADMIN — FUROSEMIDE 40 MG: 40 TABLET ORAL at 08:40

## 2020-01-01 RX ADMIN — SACUBITRIL AND VALSARTAN 1 TABLET: 24; 26 TABLET, FILM COATED ORAL at 08:40

## 2020-01-01 RX ADMIN — IPRATROPIUM BROMIDE 0.5 MG: 0.5 SOLUTION RESPIRATORY (INHALATION) at 16:23

## 2020-01-01 RX ADMIN — MULTIPLE VITAMINS W/ MINERALS TAB 1 TABLET: TAB at 14:26

## 2020-01-01 RX ADMIN — ISOSORBIDE MONONITRATE 60 MG: 60 TABLET, EXTENDED RELEASE ORAL at 17:14

## 2020-01-01 RX ADMIN — MULTIPLE VITAMINS W/ MINERALS TAB 1 TABLET: TAB at 08:40

## 2020-01-01 RX ADMIN — ASPIRIN 81 MG 81 MG: 81 TABLET ORAL at 14:25

## 2020-01-01 RX ADMIN — ARFORMOTEROL TARTRATE 15 MCG: 15 SOLUTION RESPIRATORY (INHALATION) at 18:20

## 2020-01-01 RX ADMIN — ASPIRIN 81 MG 81 MG: 81 TABLET ORAL at 08:40

## 2020-01-01 RX ADMIN — CARVEDILOL 25 MG: 25 TABLET, FILM COATED ORAL at 17:13

## 2020-01-01 RX ADMIN — FINASTERIDE 5 MG: 5 TABLET, FILM COATED ORAL at 08:40

## 2020-01-01 RX ADMIN — INSULIN LISPRO 10 UNITS: 100 INJECTION, SOLUTION INTRAVENOUS; SUBCUTANEOUS at 13:26

## 2020-01-01 RX ADMIN — ASPIRIN 81 MG 243 MG: 81 TABLET ORAL at 11:15

## 2020-01-01 RX ADMIN — ARFORMOTEROL TARTRATE 15 MCG: 15 SOLUTION RESPIRATORY (INHALATION) at 06:00

## 2020-01-01 RX ADMIN — INSULIN LISPRO 4 UNITS: 100 INJECTION, SOLUTION INTRAVENOUS; SUBCUTANEOUS at 18:43

## 2020-01-01 RX ADMIN — BUDESONIDE 250 MCG: 0.25 INHALANT RESPIRATORY (INHALATION) at 18:13

## 2020-01-01 RX ADMIN — INSULIN LISPRO 4 UNITS: 100 INJECTION, SOLUTION INTRAVENOUS; SUBCUTANEOUS at 20:37

## 2020-01-01 RX ADMIN — ISOSORBIDE MONONITRATE 60 MG: 60 TABLET, EXTENDED RELEASE ORAL at 08:40

## 2020-01-01 RX ADMIN — SODIUM CHLORIDE 500 ML: 9 INJECTION, SOLUTION INTRAVENOUS at 12:18

## 2020-01-01 RX ADMIN — Medication 500 ML: at 12:18

## 2020-01-01 RX ADMIN — INSULIN GLARGINE 25 UNITS: 100 INJECTION, SOLUTION SUBCUTANEOUS at 20:37

## 2020-01-01 RX ADMIN — INSULIN GLARGINE 25 UNITS: 100 INJECTION, SOLUTION SUBCUTANEOUS at 21:43

## 2020-01-01 RX ADMIN — CARVEDILOL 25 MG: 25 TABLET, FILM COATED ORAL at 08:40

## 2020-01-01 RX ADMIN — IPRATROPIUM BROMIDE 0.5 MG: 0.5 SOLUTION RESPIRATORY (INHALATION) at 06:03

## 2020-01-01 RX ADMIN — ENOXAPARIN SODIUM 40 MG: 40 INJECTION SUBCUTANEOUS at 18:42

## 2020-01-01 RX ADMIN — IPRATROPIUM BROMIDE 0.5 MG: 0.5 SOLUTION RESPIRATORY (INHALATION) at 18:13

## 2020-01-01 RX ADMIN — FUROSEMIDE 40 MG: 40 TABLET ORAL at 18:41

## 2020-01-01 RX ADMIN — BUDESONIDE 250 MCG: 0.25 INHALANT RESPIRATORY (INHALATION) at 06:03

## 2020-01-01 RX ADMIN — ENOXAPARIN SODIUM 40 MG: 40 INJECTION SUBCUTANEOUS at 16:23

## 2020-01-01 RX ADMIN — IPRATROPIUM BROMIDE 0.5 MG: 0.5 SOLUTION RESPIRATORY (INHALATION) at 05:59

## 2020-01-01 RX ADMIN — FOLIC ACID 1 MG: 1 TABLET ORAL at 14:24

## 2020-01-01 RX ADMIN — INSULIN LISPRO 6 UNITS: 100 INJECTION, SOLUTION INTRAVENOUS; SUBCUTANEOUS at 11:38

## 2020-01-01 RX ADMIN — IPRATROPIUM BROMIDE 0.5 MG: 0.5 SOLUTION RESPIRATORY (INHALATION) at 09:06

## 2020-01-01 RX ADMIN — SACUBITRIL AND VALSARTAN 1 TABLET: 24; 26 TABLET, FILM COATED ORAL at 20:36

## 2020-01-01 RX ADMIN — PERFLUTREN 2.2 MG: 6.52 INJECTION, SUSPENSION INTRAVENOUS at 09:34

## 2020-01-01 RX ADMIN — ARFORMOTEROL TARTRATE 15 MCG: 15 SOLUTION RESPIRATORY (INHALATION) at 16:24

## 2020-01-01 RX ADMIN — ENOXAPARIN SODIUM 40 MG: 40 INJECTION SUBCUTANEOUS at 17:14

## 2020-01-01 RX ADMIN — BUDESONIDE 250 MCG: 0.25 INHALANT RESPIRATORY (INHALATION) at 18:20

## 2020-01-01 RX ADMIN — ATORVASTATIN CALCIUM 10 MG: 10 TABLET, FILM COATED ORAL at 14:26

## 2020-01-01 RX ADMIN — FOLIC ACID 1 MG: 1 TABLET ORAL at 08:40

## 2020-01-01 RX ADMIN — IPRATROPIUM BROMIDE 0.5 MG: 0.5 SOLUTION RESPIRATORY (INHALATION) at 12:36

## 2020-01-01 RX ADMIN — IPRATROPIUM BROMIDE 0.5 MG: 0.5 SOLUTION RESPIRATORY (INHALATION) at 18:20

## 2020-01-01 RX ADMIN — SACUBITRIL AND VALSARTAN 1 TABLET: 24; 26 TABLET, FILM COATED ORAL at 21:43

## 2020-01-01 ASSESSMENT — ENCOUNTER SYMPTOMS
NAUSEA: 0
ABDOMINAL PAIN: 0
WHEEZING: 0
SHORTNESS OF BREATH: 1
BACK PAIN: 0
CHEST TIGHTNESS: 0
EYES NEGATIVE: 1
VOMITING: 0
DIARRHEA: 0
COUGH: 0
COLOR CHANGE: 0

## 2020-01-01 ASSESSMENT — PAIN SCALES - GENERAL: PAINLEVEL_OUTOF10: 0

## 2020-04-09 PROBLEM — R55 SYNCOPE AND COLLAPSE: Status: ACTIVE | Noted: 2020-01-01

## 2020-04-09 NOTE — CONSULTS
by mouth daily, Disp: 30 tablet, Rfl: 0    Accu-Chek Multiclix Lancets MISC, USE TO TEST 5 TIMES DAILY, Disp: 500 each, Rfl: 1      ALLERGIES:  Coumadin [warfarin sodium]      REVIEW OF SYSTEMS:     · Constitutional: Denies fevers, chills, night sweats, and generalized fatigue. Denies significant weight loss or weight gain. · HEENT: Denies headaches, nose bleeds, rhinorrhea, sore throat. Denies blurred vision. Denies dysphagia, odynophagia. · Musculoskeletal: Denies falls, pain to BLE with ambulation. Denies muscle weakness. · Neurological: Denies dizziness and lightheadedness, numbness and tingling. Denies focal neurological deficits. · Cardiovascular: +peripheral edema. Denies chest pain, palpitations, diaphoresis. Denies PND, orthopnea, peripheral edema. · Respiratory: +shortness of breath on exertion. Denies cough, hemoptysis. · Gastrointestinal: Denies abdominal pain, nausea/vomiting, diarrhea and constipation, black/bloody, and tarry stools. · Genitourinary: Denies dysuria and hematuria. · Hematologic: Denies excessive bruising or bleeding. · Endocrine: Denies excessive thirst. Denies intolerance to hot and cold. PHYSICAL EXAM:   BP (!) 172/78   Pulse 70   Temp 97.7 °F (36.5 °C) (Oral)   Resp 24   Ht 5' 8\" (1.727 m)   Wt 191 lb 2 oz (86.7 kg)   SpO2 94%   BMI 29.06 kg/m²   CONST:  Well developed, well nourished WM who appears stated age. Awake, alert, cooperative, no apparent distress. HEENT:   Head- Normocephalic, atraumatic. Eyes- Conjunctivae pink, anicteric. Throat- Oral mucosa pink and moist.  Neck-  No stridor, trachea midline, no jugular venous distention. CHEST: Chest symmetrical and non-tender to palpation. No accessory muscle use or intercostal retractions. RESPIRATORY: Lung sounds - clear throughout fields. No wheezing, rales or rhonchi. CARDIOVASCULAR:     No carotid bruit. Heart Inspection- shows no noted pulsations.   Heart Palpation- no heaves or thrills; PMI is disease. 4. Coronary artery disease s/p CABG x 5 in 1996.  5. Hypertension: Uncontrolled. Medications will be adjusted accordingly. 6. Hyperlipidemia: Continue statin therapy. 7. Diabetes mellitus type II with retinopathy. 8. Moderate MR.   9. Mild to moderate TR.   10. Chronic LBBB. 11. Mild pulmonary hypertension. 15. Former tobacco abuse. RECOMMENDATIONS:  1. Check orthostatic vital signs. 2. Echocardiogram ordered. 3. Monitor on telemetry. 4. Continue home cardiac medications. Consider IV diuresis. 5. Further recommendations as per Dr. John Cisneros. Above case and recommendations to be discussed with Dr. John Cisneros. Further recommendations and assessment/plan as per Dr. John Cisneros. Electronically signed by Bhanu Monroy PA-C on 4/9/2020 at [address] [facility] MD    I have personally participated in a face-to-face and personally obtained history and performed physical exam on the date of service. I reviewed chart, vitals, labs and radiologic studies. I also participated in medical decision making with Bhanu Monroy PA-C on the date of service All of the assessments and recommendations are from me and I agree with all of the pertinent clinical information, assessment and treatment plan. I have reviewed and edited the note above based on my findings during my history, exam, and decision making. Please see my additional contributions to the history, physical exam, assessment, and recommendations below. Reason for Consult: Syncope  Requesting Physician: Hollis Klein  Chief Complaint: Syncope  History Obtained From: Patient       HISTORY OF PRESENT ILLNESS:   patient is an 78years old male with history of CAD status post CABG, ischemic cardiomyopathy, chronic systolic congestive heart failure, diabetes mellitus, hypertension, presented to the hospital with a syncopal episode.   Patient stated he was walking in his driveway for hemoptysis  CARDIOVASCULAR: as per HPI  GASTROINTESTINAL:  negative for nausea, vomiting, diarrhea, constipation, pruritus and jaundice  GENITOURINARY:  negative for frequency, dysuria, nocturia, urinary incontinence and hesitancy  HEMATOLOGIC/LYMPHATIC:  negative for easy bruising, bleeding, lymphadenopathy and petechiae  ALLERGIC/IMMUNOLOGIC:  negative for urticaria, hay fever and angioedema  ENDOCRINE:  negative for heat intolerance, cold intolerance, tremor, hair loss and diabetic symptoms including neither polyuria nor polydipsia nor blurred vision  MUSCULOSKELETAL:  negative for  myalgias, arthralgias, joint swelling, stiff joints and decreased range of motion  NEUROLOGICAL:  negative for memory problems, speech problems, visual disturbance, dysphagia, weakness and numbness      PHYSICAL EXAM:   CONSTITUTIONAL:  awake, alert, cooperative, no apparent distress, and appears stated age  EYES:  lids and lashes normal and pupils equal, round and reactive to light, anicteric sclerae  HEAD:  normocepalic, without obvious abnormality, atraumatic, pink, moist mucous membranes. NECK:  Supple, symmetrical, trachea midline, no adenopathy, thyroid symmetric, not enlarged and no tenderness, skin normal  HEMATOLOGIC/LYMPHATICS:  no cervical lymphadenopathy and no supraclavicular lymphadenopathy  LUNGS:  No increased work of breathing, good air exchange, clear to auscultation bilaterally, no crackles or wheezing  CARDIOVASCULAR:  Normal apical impulse, regular rate and rhythm, normal S1 and S2, no S3 or S4, 2 out of 6 systolic murmur at the apex, 3 out of 6 stock murmur at the left lower sternal border, no JVD, no carotid bruit, no pedal edema, good carotid upstroke bilaterally. ABDOMEN:  Soft, nontender, no masses, no hepatomegaly or splenomegaly, BS+  CHEST: nontender to palpation, expands symmetrically  MUSCULOSKELETAL:  No clubbing no cyanosis. there is no redness, warmth, or swelling of the joints  full range of motion noted  NEUROLOGIC:  Alert, awake,oriented x3, no focal neurologic deficit was appreciated  SKIN:  no bruising or bleeding, normal skin color, texture, turgor and no redness, warmth, or swelling        BP (!) 163/72   Pulse 67   Temp 97.7 °F (36.5 °C) (Oral)   Resp 24   Ht 5' 8\" (1.727 m)   Wt 191 lb 2 oz (86.7 kg)   SpO2 94%   BMI 29.06 kg/m²     DATA:   I personally reviewed the admission EKG with the following interpretation: Sinus rhythm, left bundle branch block, ST depression in the anterior septal leads    ECHO: 6/12/2017,Summary   Left ventricle is moderately enlarged . Mild concentric left ventricular hypertrophy   Abnormal (paradoxical) motion consistent with conduction abnormality. Overall ejection fraction severely decreased . There is doppler evidence of stage III diastolic dysfunction. The left atrium is moderately dilated. Right ventricle global systolic function is reduced . Moderate mitral regurgitation is present. Mild to moderate tricuspid regurgitation. Pulmonary hypertension is mild . Stress Test:   Angiography: 1/21/2014,CONCLUSIONS:   1. Coronary artery disease. a. Occluded native coronary arteries. b.    Patent LIMA to LAD. 50% to 60% disease of the LAD shortly after          the distal anastomosis. c.    SVG to LCX OM-1 patent. d.    SVG to LCX OM-2 patent.         e.    SVG to diagonal branch occluded. f.    SVG to distal RCA patent. 2.    Systemic hypertension. 3.    Elevated left ventricular end diastolic pressure consistent with LV      dysfunction. 4.    Closure of the right femoral artery access site with ExoSeal device.        Cardiology Labs:   BMP:    Lab Results   Component Value Date     04/09/2020    K 4.6 04/09/2020     04/09/2020    CO2 26 04/09/2020    BUN 32 04/09/2020     CMP:    Lab Results   Component Value Date     04/09/2020    K 4.6 04/09/2020     04/09/2020    CO2 26 04/09/2020    BUN 32 04/09/2020    PROT 6.3 09/25/2019     CBC:    Lab Results   Component Value Date    WBC 9.2 04/09/2020    RBC 3.34 04/09/2020    HGB 11.8 04/09/2020    HCT 36.5 04/09/2020    .3 04/09/2020    RDW 13.7 04/09/2020     04/09/2020     PT/INR:  No results found for: PTINR  PT/INR Warfarin:  No components found for: PTPATWAR, PTINRWAR  PTT:    Lab Results   Component Value Date    APTT 30.6 01/19/2014     PTT Heparin:  No components found for: APTTHEP  Magnesium:    Lab Results   Component Value Date    MG 2.2 06/12/2017     TSH:    Lab Results   Component Value Date    TSH 1.030 05/02/2018     TROPONIN:  No components found for: TROP  BNP:    Lab Results   Component Value Date     01/19/2014     FASTING LIPID PANEL:    Lab Results   Component Value Date    CHOL 141 09/26/2018    HDL 52 09/26/2018    TRIG 115 09/26/2018     XR CHEST PORTABLE   Final Result   Cardiomegaly more prominent than previous with stable postsurgical changes in   the mediastinum. I have personally reviewed the laboratory, cardiac diagnostic and radiographic testing as outlined above:    IMPRESSION:  1. Syncope: Exertional, secondary to tachyarrhythmia precipitated by ischemia?,  No recent functional ischemic evaluation, will schedule for Lexiscan stress test.  2. Dyspnea on exertion: Has been going on for the last couple of weeks, equivalent angina?,  As above. 3.   CAD: S/p CABG, cardiac catheterization in 2014 revealed patent grafts and LIMA to LAD with a 50 to 60% stenosis of LAD distal to LIMA to LAD anastomosis, will continue current treatment  4. Chronic systolic congestive heart failure: Compensated  5. Ischemic cardiomyopathy:Declined AICD in the past  6. Mitral valve regurgitation: Moderate  7. Tricuspid valve regurgitation: Mild to moderate  8. Hypertension: Not well controlled, will adjust medications  9. Type 2 diabetes mellitus  10.  Hyperlipidemia: On statin      RECOMMENDATIONS:

## 2020-04-09 NOTE — ED PROVIDER NOTES
with T wave flattening in [address]          --------------------------------------------- PAST HISTORY ---------------------------------------------  Past Medical History:  has a past medical history of acute renal failure, Benign mole, CAD (coronary artery disease), CHF (congestive heart failure) ([facility] [address] Diabetes mellitus ([facility] [address] Enlarged prostate, Erectile dysfunction, H/O cardiovascular stress test, H/O Doppler echocardiogram, History of blood transfusion, History of echocardiogram, History of echocardiogram, Hyperlipidemia, Hypertension, MI (mitral incompetence), MI (myocardial infarction) ([facility] [address] Mitral regurgitation, Osteoarthritis, Pulmonary hypertension ([facility] [address] and Tricuspid regurgitation. Past Surgical History:  has a past surgical history that includes Coronary artery bypass graft (2/96); eye surgery; transthoracic echocardiogram (1/31/10 EF 35-40%); Diagnostic Cardiac Cath Lab Procedure (2/96); TURP; Cardiac catheterization (1/20/2014); Endoscopy, colon, diagnostic; Colonoscopy; and skin biopsy. Social History:  reports that he quit smoking about 30 years ago. His smoking use included cigarettes. He has a 32.00 pack-year smoking history. He has never used smokeless tobacco. He reports that he does not drink alcohol or use drugs. Family History: family history includes Heart Attack in his father; Heart Disease in his father and sister; High Blood Pressure in his brother, brother, sister, sister, sister, and sister; Stroke in his brother and mother. The patients home medications have been reviewed.     Allergies: Coumadin [warfarin sodium]    -------------------------------------------------- RESULTS -------------------------------------------------    Lab  Results for orders placed or performed during the hospital encounter of 15/59/04   Basic metabolic panel   Result Value Ref Range    Sodium 142 132 - 146 mmol/L    Potassium 4.6 3.5 - 5.0 mmol/L    Chloride 107 98 - 107

## 2020-04-09 NOTE — CARE COORDINATION
Emergency Department Social Work Assessment:    Pt presents to the ED from Dr. Hiram Whitman office with shortness of breath. Per report and chart review, pt is from home, independent PTA, does not own/use DME or home O2. No discharge needs identified at this time. Assigned SW/CM to follow.     Electronically signed by LOUISA Portillo LSW on 4/9/2020 at 2:32 PM

## 2020-04-10 NOTE — H&P
H&P Note        CHIEF COMPLAINT: Shortness of breath      HISTORY OF PRESENT ILLNESS:      The patient is a 78 y.o. male who presents with the above to my office stated that he has been progressively getting shortness of breath with mild activity symptoms has been worsening due to medications no medication side effects nothing makes it better except rest and he started to get lightheaded also with activity and had one episode of loss of consciousness. Patient is known to have history of severe cardiomyopathy secondary to coronary artery disease status post remote MI also have history of COPD. Patient is diabetic type I. He did not have hypoglycemic episodes. When I checked his blood pressure in the office he had significant orthostasis. EKG was done in the office and showed that he has ST segment changes suggestive of myocardial ischemia. Also developed new bundle branch block. Patient is admitted to the hospital with consultation with cardiology for the above. Past Medical History:    Past Medical History:   Diagnosis Date    acute renal failure     Acute renal failure, prerenal azotemia and hyperkalemia with addition of Lasix and Spironolactone in 1/10, 2/10    Benign mole     removed from right ear pinna    CAD (coronary artery disease)     CHF (congestive heart failure) ([facility] [address] 1/10    COPD (chronic obstructive pulmonary disease) ([facility] [address] [facility]     Diabetes mellitus ([facility] [address] 1994    on insulin     Enlarged prostate     Erectile dysfunction     H/O cardiovascular stress test 11/21/11    Lexiscan: Infarction involving lateral wall extending to anterolateral wall and inferolateral wall and basal anterior wall. No evidence of residual stress-induced ischemia. LV appearing mod to severely dilated, no additional increase in LV cavity size after stress. Gated views showing severe hypokinesis to akinesis of inferior wall and IVS with dyskinesis of LV apex with EF 32%.      H/O Doppler echocardiogram 1/31/10 CREATININE 1.1 04/10/2020    GFRAA >60 04/10/2020    LABGLOM >60 04/10/2020    GLUCOSE 215 04/10/2020    GLUCOSE 317 06/01/2012    PROT 6.1 04/10/2020    LABALBU 3.6 04/10/2020    CALCIUM 9.2 04/10/2020    BILITOT 0.6 04/10/2020    ALKPHOS 74 04/10/2020    AST 25 04/10/2020    ALT 30 04/10/2020     PT/INR:    Lab Results   Component Value Date    PROTIME 22.3 06/19/2017    INR 2.0 06/19/2017     Last 3 Troponin:    Lab Results   Component Value Date    TROPONINI 0.03 04/10/2020    TROPONINI 0.02 04/09/2020    TROPONINI 0.03 04/09/2020      NM Cardiac Stress Test Nuclear Imaging [[id number]] Collected: 04/10/20 1219      Order Status: Completed Updated: 04/10/20 1231     Narrative:       EXAMINATION:  MYOCARDIAL PERFUSION IMAGING    4/10/2020 8:31 am    TECHNIQUE:  Rest dose:  10.2 mCi Tc-99m sestamibi intravenously    Stress dose:  28 mCi Tc-99m sestamibi intravenously    Under cardiology supervision, 0.4 mg Lexiscan was infused intravenously prior  to injection of the stress dose. SPECT imaging was acquired following injection of the sestamibi.  ECG gating  was obtained following the stress acquisition. COMPARISON:  None Available. HISTORY:  ORDERING SYSTEM PROVIDED HISTORY: Shortness of breath  TECHNOLOGIST PROVIDED HISTORY:  Reason for Exam: Shortness of breath  Procedure Type->Rx  Reason for exam:->shortness of breath    Diabetes.  Hypertension.  High cholesterol.  History of CHF.  Cardiomyopathy. History of MRI. FINDINGS:  Perfusion:    Image quality is good with no significant attenuation artifact. Severe intensity fixed defect involving the apical, apical anterior, and mid  anterior and anterolateral segments. Severe intensity fixed defect involving the apical inferior and mid and  basilar inferior and inferolateral segments. No reversible defect.     Summed stress score:  30    Summed rest score:  30    Summed reversibility score:  0    Scores are visually adjusted for potential artifact. TID score:  1.05 (threshold value of 1.39 is used for Lexiscan stress with  Tc-99m)    Function:    End diastolic volume:  811YT    Left ventricular ejection fraction:  15%    Wall motion abnormalities:  Severe global hypokinesis.  Akinesis in the  anterolateral and inferolateral walls.     Impression:       Perfusion: Abnormal exam    1.  Large fixed defects in the anterolateral and inferolateral walls  concerning for infarct. 2.  No reversible defect to suggest ischemia. Function: Abnormal exam    1.  Severe global hypokinesis with akinesis in the anterolateral and  inferolateral walls. 2.  Severely reduced ejection fraction of 15%. 3.  Dilated left ventricle.     XR CHEST PORTABLE [[id number]] Collected: 04/09/20 1127     Order Status: Completed Updated: 04/09/20 1131     Narrative:       EXAMINATION:  ONE XRAY VIEW OF THE CHEST    4/9/2020 10:56 am    COMPARISON:  February 5, 2019    HISTORY:  ORDERING SYSTEM PROVIDED HISTORY: chest pain  TECHNOLOGIST PROVIDED HISTORY:  Reason for exam:->chest pain    FINDINGS:  There is no evidence of effusion, pneumothorax or focal infiltrate.  The  heart is enlarged.  Postsurgical changes in the mediastinum are stable. Sternotomy wires are intact.  Visualized bony thorax shows no acute  abnormality.     Impression:       Cardiomegaly more prominent than previous with stable postsurgical changes in  the mediastinum.        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

## 2020-04-11 NOTE — PROGRESS NOTES
Physical Therapy Initial Evaluation    Room #:  9889/8069-44  Patient Name: Reny Gorman  YOB: 1940  MRN: [id number]    Referring Provider:       Brandon Rachel MD         Date of Service: 4/11/2020    Evaluating Physical Therapist:  Amol Connell, PT Lic.   #[id number]         Diagnosis:   Syncope and collapse [R55]       Patient Active Problem List   Diagnosis    [facility]    Type I diabetes mellitus with nephropathy ([facility]    Hypertension    CAD (coronary artery disease)    H/O acute myocardial infarction of inferior wall    Hx of non-ST elevation myocardial infarction (NSTEMI)    Ischemic cardiomyopathy    Mitral valve disease    Stage 3 chronic kidney disease ([facility]    S/P TURP    Hx of CABG    H/O hyperkalemia    Abnormal nuclear stress test    LBBB (left bundle branch block)    LV dysfunction    PAD (peripheral artery disease) (HCC)    Hypotension    First degree AV block    Sinus bradycardia    Acute on chronic systolic heart failure ([facility])    Cough due to ACE inhibitor    Pneumonia    Atherosclerosis of native coronary artery of native heart without angina pectoris    Hyperlipidemia    Essential hypertension    Type 1 diabetes mellitus without complication (HCC)    Acute on chronic congestive heart failure ([facility]    Pure hypercholesterolemia    Pulmonary embolism without acute cor pulmonale (HCC)    Non-ST elevation (NSTEMI) myocardial infarction (HCC)    Acute kidney injury (nontraumatic) ([facility])    Non-rheumatic mitral regurgitation    Hx of pulmonary embolus    Anticoagulated on Coumadin    Heat syncope    Acute cystitis    Chronic bronchitis (HCC)    Hilar adenopathy    Mediastinal adenopathy    Non-rheumatic tricuspid valve insufficiency    Anemia    Transfusion history    Dermatitis herpetiformis    Type 2 diabetes mellitus with complication, with long-term current use of insulin (HCC)    Syncope and collapse    Shortness of

## 2020-04-11 NOTE — DISCHARGE SUMMARY
Discharge Summary Note  Patient ID:  Kiya Fong  [id number]  65 y.o.  1940    Admit date: 4/9/2020    Discharge date and time: No discharge date for patient encounter. Admitting Physician: Analia Smith MD     Discharge Physician: Analia Smith MD    Admission Diagnoses:   Syncope and collapse [R55]    Discharge Diagnoses:     Acute on chronic systolic heart failure  Ischemic cardiomyopathy  Diabetes mellitus type 1  Diabetic nephropathy with stage III chronic kidney disease  History of remote myocardial infarction  History of CABG  Hyperlipidemia  COPD  Pacemaker and defibrillator in place        Admission Condition: poor    Discharged Condition: stable    Hospital Course: Patient was admitted to the hospital because of increasing shortness of breath. Patient had syncopal episode that is probably related to orthostatic hypotension. Patient is known to have history of severe cardiomyopathy with severe systolic dysfunction with an EF of 15%. Patient reported that he is getting more shortness of breath with mild activity. Patient was admitted to the hospital because of the above with consultation with cardiology. Is no evidence of acute myocardial infarction. Echocardiogram and stress test were done and showed that he has EF about the same and he does not have evidence of ischemia. Patient had IV Lasix medications were adjusted. Patient was put on fluid restriction. Low-salt diet. CHF education. Diabetes was monitored and blood sugar has been treated accordingly.   Capital stabilization of patient condition patient was discharged home to follow-up as outpatient I discussed with him that he has to be seen frequently that we can monitor his weight monitor his BMP and adjust his medications as needed to keep him from having to come back to the hospital for recurrence of CHF especially with his very low EF he is very high risk to have worsening of his CHF    Consults: cardiology    Significant Diagnostic Studies: labs:  As labs, radiology: CXR: Findings of CHF, nuclear medicine: James Crawford results as above and cardiac graphics: ECG: As an ECG, Echocardiogram: For details please refer to echo results and Stress Test: [place] as above    Treatments: cardiac meds: carvedilol, furosemide and Entresto    Discharge Exam:  Alert oriented in no acute distress  HEENT un  Remarkable  Neck no JVDs or HJR  Lungs clear to auscultation  Heart regular rate and rhythm no S3 gallop      Disposition: home    Patient Instructions:     Discharge Medications:   Lin Allred   Home Medication Instructions JNS:[id number]    Printed on:04/11/20 4945   Medication Information                      Accu-Chek Multiclix Lancets MISC  USE TO TEST 5 TIMES DAILY             acetaminophen (TYLENOL) 325 MG tablet  Take 2 tablets by mouth every 4 hours as needed for Pain             aspirin 81 MG tablet  Take 81 mg by mouth daily             carvedilol (COREG) 25 MG tablet  Take 1 tablet by mouth 2 times daily             dapsone 25 MG tablet  Take 25 mg by mouth daily              finasteride (PROSCAR) 5 MG tablet  Take 1 tablet by mouth daily             fluticasone (FLONASE) 50 MCG/ACT nasal spray  1 spray by Nasal route 2 times daily             Fluticasone-Umeclidin-Vilant (TRELEGY ELLIPTA) 100-62.5-25 MCG/INH AEPB  Inhale 1 puff into the lungs daily              folic acid (FOLVITE) 1 MG tablet  Take 1 tablet by mouth daily             furosemide (LASIX) 40 MG tablet  Take 1 tablet by mouth daily             influenza virus trivalent vaccine (FLUZONE) injection  Inject 0.5 mLs into the muscle once Given 10/2019             Insulin Aspart Prot & Aspart (NOVOLOG MIX 70/30 FLEXPEN SC)  Inject 0-25 Units into the skin 4 times daily *Per Sliding Scale*             insulin glargine (LANTUS SOLOSTAR) 100 UNIT/ML injection  Inject 25 Units into the skin nightly             Multiple Vitamins-Minerals (THERAPEUTIC

## 2020-04-28 NOTE — PROGRESS NOTES
ENDOCRINE:  Denies polyphagia, polydipsia or polyuria.  Denies heat or cold intolerance. MUSCULOSKELETAL:  Denies arthralgias or myalgias. SKIN:  Denies rashes, ulcers or itching. HEME/LYMPH:  Denies any palpable lymph nodes, bleeding or easy bruisability. HEART:  As above. LUNGS:  Denies cough or sputum production. GI: Denies abdominal pain, nausea, vomiting,diarrhea, constipation, rectal bleeding or tarry stools. :  Denies hematuria or dysuria. PSYCHIATRIC:  Denies mood changes, anxiety or depression. NEUROLOGIC:  Denies memory loss, motor weakness, numbness, tingling or tremors.         CARDIOVASCULAR HISTORY:   1.  Coronary artery disease/ischemic cardiomyopathy/chronic combined systolic and diastolic congestive heart failure (with recurrent decompensations):  a.  Inferior wall myocardial infarction in 1992 for which he received thrombolytic therapy. b. Felicia Johnson ? non Q wave myocardial infarction. c.  February 1996 Cardiac catheterization mild to moderate LV dysfunction. Severe five vessel coronary artery disease. d. Frantz Santiago 1996: Coronary artery bypass surgery with a LIMA to the LAD, SVG to diagonal, SVG to obtuse marginal, SVG to LCX and SVG to PDA.   e. 12/14/12: [facility] nuclear stress test showed a large transmural myocardial infarction involving the anterolateral wall, the lateral wall, the inferolateral wall and the basal anterior wall with a small area of mild residual stress-induced ischemia involving the basal anterior wall with the left ventricle appearing severely dilated and with the gated views showing apical akinesis, septal dyskinesis, anteroapical dyskinesis, severe hypokinesis of the inferior wall and also the rest of the interventricular septum with moderately to severely impaired global left ventricular systolic function with a computer-calculated ejection fraction of 30%. [facility] catheterization done on 01/20/14 showed occluded native arteries.  Patent LIMA to LAD with 50-60% disease of the LAD shortly after the distal anastomosis. Patent SVG to LCX OM1. Patent SVG to LCX OM2. Occluded SVG to diagonal branch. Patent SVG to distal RCA. Systemic hypertension. Elevated left ventricular end-diastolic pressure. Closure of the right femoral artery access site with Exoseal device.   g. 12/04/14 [facility] nuclear stress test was an abnormal study showing an infarction involving the anterolateral wall, inferolateral wall, lateral wall and the basal anterior wall with no evidence of any significant residual stress-induced ischemia with the left ventricle appearing moderately-to-severely dilated with the gated views showing apical dyskinesis, septal akinesis and inferior wall hypokinesis with a calculated ejection fraction of 32%.  h. ACE inhibitor-induced cough. iMargarita Fuss NSTEMI in 06/2017 (elevated cardiac enzymes in the context of acute respiratory failure, CHF and acute kidney injury with severe hyperglycemia).    j. 12/04/14: Echocardiogram showed a mildly dilated left ventricle with akinesis of the posterior wall and the basal lateral wall with severe hypokinesis of the rest of the inferior wall and the lateral wall and hypokinesis of the interventricular septum, left ventricular apex and the anterior wall with an estimated ejection fraction of 30-35% with stage I left ventricular diastolic dysfunction. Normal right ventricular size and function. Mildly dilated left atrium. Minimally sclerotic aortic valve without stenosis or insufficiency. Mild mitral regurgitation.    Quin Vargas done on 06/12/2017 was read by Dr. Eddie Nguyen as showing a moderately dilated left ventricle with mild concentric LVH with paradoxical septal motion with severely impaired left ventricular systolic function an estimated EF of 20-25% with stage III left ventricular diastolic dysfunction.  Normal right ventricular size with reduced right ventricular systolic function.  Moderate mitral regurgitation.  Mild-to-moderate tricuspid regurgitation.  Mild pulmonary hypertension. 2. Diabetes mellitus diagnosed in 1994; on Insulin:    a.  Diabetic retinopathy and macular edema status post bilateral eye laser therapy. 3. History of tobacco abuse; quit 1992.   4. ICD implantation was discussed with patient on more than one occasion: He politely declined. 5. Chronic kidney disease. Acute renal failure, prerenal azotemia and hyperkalemia with the addition of Lasix and spironolactone in 1/2010 / 2/2010. Acute renal failure/prerenal azotemia in 06/2017.  6. Left bundle branch block. 7. Peripheral arterial disease:    a.  Lower extremity arterial studies done at [facility] on 6/11/14 were read as suggestive of bilateral calcified arterial sclerosis with no major arterial segmental stenosis of the bilateral lower extremities at rest with evidence of bilateral tibiovessel disease and evidence of microvascular disease affecting the digits bilaterally. Right CELINE 0.77 (PTA) and 0.64 (DPA). Left CELINE 0.88 (PTA) and 0.88 (DPA). 8.  Syncopal episode in 10/2017: Vasovagal reaction, orthostatic hypotension, dehydration and acute cystitis.    9. Syncopal episode 4/9/2020  10. [facility] [place] nuclear stress test 4/10/2020:  Perfusion: Abnormal exam: Large fixed defects in the anterolateral and inferolateral walls concerning for infarct. No reversible defect to suggest ischemia.   Function: Abnormal exam: Severe global hypokinesis with akinesis in the anterolateral and inferolateral walls. Severely reduced ejection fraction of 15%. Dilated left ventricle. 11. Echo 4/10/20/20 ( Dr. Heidi Phelps ):Summary   Compared to prior echo, changes noted. Technically adequate study. Mild asymmetric septal hypertrophy. Ejection fraction is visually estimated at 20-25%. apical and anteroapical wall hypokinesis   Abnormal (paradoxical) motion consistent with conduction abnormality. E/A flow reversal noted.  Suggestive of